# Patient Record
Sex: FEMALE | Race: BLACK OR AFRICAN AMERICAN | NOT HISPANIC OR LATINO | Employment: OTHER | URBAN - METROPOLITAN AREA
[De-identification: names, ages, dates, MRNs, and addresses within clinical notes are randomized per-mention and may not be internally consistent; named-entity substitution may affect disease eponyms.]

---

## 2020-01-01 ENCOUNTER — ANESTHESIA EVENT (OUTPATIENT)
Dept: INTENSIVE CARE | Facility: OTHER | Age: 55
End: 2020-01-01

## 2020-01-01 ENCOUNTER — ANESTHESIA (OUTPATIENT)
Dept: INTENSIVE CARE | Facility: OTHER | Age: 55
End: 2020-01-01

## 2020-01-01 ENCOUNTER — HOSPITAL ENCOUNTER (INPATIENT)
Facility: OTHER | Age: 55
LOS: 1 days | DRG: 871 | End: 2020-03-20
Attending: EMERGENCY MEDICINE | Admitting: EMERGENCY MEDICINE
Payer: MEDICARE

## 2020-01-01 VITALS
SYSTOLIC BLOOD PRESSURE: 188 MMHG | BODY MASS INDEX: 30.42 KG/M2 | TEMPERATURE: 98 F | WEIGHT: 182.56 LBS | DIASTOLIC BLOOD PRESSURE: 116 MMHG | OXYGEN SATURATION: 92 % | RESPIRATION RATE: 25 BRPM | HEIGHT: 65 IN

## 2020-01-01 DIAGNOSIS — R41.82 ALTERED MENTAL STATUS: ICD-10-CM

## 2020-01-01 DIAGNOSIS — J18.9 COMMUNITY ACQUIRED PNEUMONIA, BILATERAL: Primary | ICD-10-CM

## 2020-01-01 DIAGNOSIS — R00.0 TACHYCARDIA: ICD-10-CM

## 2020-01-01 DIAGNOSIS — R05.9 COUGH: ICD-10-CM

## 2020-01-01 DIAGNOSIS — J96.01 ACUTE RESPIRATORY FAILURE WITH HYPOXIA: ICD-10-CM

## 2020-01-01 LAB
ALBUMIN SERPL BCP-MCNC: 2.4 G/DL (ref 3.5–5.2)
ALBUMIN SERPL BCP-MCNC: 2.7 G/DL (ref 3.5–5.2)
ALLENS TEST: ABNORMAL
ALLENS TEST: ABNORMAL
ALP SERPL-CCNC: 173 U/L (ref 55–135)
ALP SERPL-CCNC: 306 U/L (ref 55–135)
ALT SERPL W/O P-5'-P-CCNC: 27 U/L (ref 10–44)
ALT SERPL W/O P-5'-P-CCNC: 46 U/L (ref 10–44)
ANION GAP SERPL CALC-SCNC: 16 MMOL/L (ref 8–16)
ANION GAP SERPL CALC-SCNC: 23 MMOL/L (ref 8–16)
ANION GAP SERPL CALC-SCNC: 27 MMOL/L (ref 8–16)
ANISOCYTOSIS BLD QL SMEAR: SLIGHT
APTT BLDCRRT: 48.5 SEC (ref 21–32)
AST SERPL-CCNC: 173 U/L (ref 10–40)
AST SERPL-CCNC: 63 U/L (ref 10–40)
BACTERIA #/AREA URNS HPF: ABNORMAL /HPF
BASOPHILS # BLD AUTO: 0.14 K/UL (ref 0–0.2)
BASOPHILS # BLD AUTO: ABNORMAL K/UL (ref 0–0.2)
BASOPHILS NFR BLD: 0 % (ref 0–1.9)
BASOPHILS NFR BLD: 0.5 % (ref 0–1.9)
BILIRUB SERPL-MCNC: 0.7 MG/DL (ref 0.1–1)
BILIRUB SERPL-MCNC: 0.8 MG/DL (ref 0.1–1)
BILIRUB UR QL STRIP: NEGATIVE
BNP SERPL-MCNC: 208 PG/ML (ref 0–99)
BUN SERPL-MCNC: 14 MG/DL (ref 6–20)
BUN SERPL-MCNC: 8 MG/DL (ref 6–20)
BUN SERPL-MCNC: 9 MG/DL (ref 6–20)
CALCIUM SERPL-MCNC: 7 MG/DL (ref 8.7–10.5)
CALCIUM SERPL-MCNC: 8.4 MG/DL (ref 8.7–10.5)
CALCIUM SERPL-MCNC: 8.4 MG/DL (ref 8.7–10.5)
CHLORIDE SERPL-SCNC: 93 MMOL/L (ref 95–110)
CHLORIDE SERPL-SCNC: 97 MMOL/L (ref 95–110)
CHLORIDE SERPL-SCNC: 98 MMOL/L (ref 95–110)
CLARITY UR: CLEAR
CO2 SERPL-SCNC: 12 MMOL/L (ref 23–29)
CO2 SERPL-SCNC: 13 MMOL/L (ref 23–29)
CO2 SERPL-SCNC: 16 MMOL/L (ref 23–29)
COLOR UR: YELLOW
CREAT SERPL-MCNC: 0.8 MG/DL (ref 0.5–1.4)
CREAT SERPL-MCNC: 1 MG/DL (ref 0.5–1.4)
CREAT SERPL-MCNC: 2.2 MG/DL (ref 0.5–1.4)
CRP SERPL-MCNC: 301.2 MG/L (ref 0–8.2)
DELSYS: ABNORMAL
DELSYS: ABNORMAL
DIFFERENTIAL METHOD: ABNORMAL
DIFFERENTIAL METHOD: ABNORMAL
EOSINOPHIL # BLD AUTO: 0 K/UL (ref 0–0.5)
EOSINOPHIL # BLD AUTO: ABNORMAL K/UL (ref 0–0.5)
EOSINOPHIL NFR BLD: 0 % (ref 0–8)
EOSINOPHIL NFR BLD: 0 % (ref 0–8)
ERYTHROCYTE [DISTWIDTH] IN BLOOD BY AUTOMATED COUNT: 14.6 % (ref 11.5–14.5)
ERYTHROCYTE [DISTWIDTH] IN BLOOD BY AUTOMATED COUNT: 16.2 % (ref 11.5–14.5)
ERYTHROCYTE [SEDIMENTATION RATE] IN BLOOD BY WESTERGREN METHOD: 22 MM/H
ERYTHROCYTE [SEDIMENTATION RATE] IN BLOOD BY WESTERGREN METHOD: 22 MM/H
EST. GFR  (AFRICAN AMERICAN): 28 ML/MIN/1.73 M^2
EST. GFR  (AFRICAN AMERICAN): >60 ML/MIN/1.73 M^2
EST. GFR  (AFRICAN AMERICAN): >60 ML/MIN/1.73 M^2
EST. GFR  (NON AFRICAN AMERICAN): 24 ML/MIN/1.73 M^2
EST. GFR  (NON AFRICAN AMERICAN): >60 ML/MIN/1.73 M^2
EST. GFR  (NON AFRICAN AMERICAN): >60 ML/MIN/1.73 M^2
FIO2: 100
FIO2: 100
GLUCOSE SERPL-MCNC: 129 MG/DL (ref 70–110)
GLUCOSE SERPL-MCNC: 56 MG/DL (ref 70–110)
GLUCOSE SERPL-MCNC: 71 MG/DL (ref 70–110)
GLUCOSE UR QL STRIP: NEGATIVE
HCO3 UR-SCNC: 18 MMOL/L (ref 24–28)
HCO3 UR-SCNC: 21.9 MMOL/L (ref 24–28)
HCT VFR BLD AUTO: 47.6 % (ref 37–48.5)
HCT VFR BLD AUTO: 57.9 % (ref 37–48.5)
HGB BLD-MCNC: 15.7 G/DL (ref 12–16)
HGB BLD-MCNC: 17.4 G/DL (ref 12–16)
HGB UR QL STRIP: ABNORMAL
HYALINE CASTS #/AREA URNS LPF: 0 /LPF
IMM GRANULOCYTES # BLD AUTO: 1.04 K/UL (ref 0–0.04)
IMM GRANULOCYTES # BLD AUTO: ABNORMAL K/UL (ref 0–0.04)
IMM GRANULOCYTES NFR BLD AUTO: 4 % (ref 0–0.5)
IMM GRANULOCYTES NFR BLD AUTO: ABNORMAL % (ref 0–0.5)
INR PPP: 1.4 (ref 0.8–1.2)
KETONES UR QL STRIP: ABNORMAL
LACTATE SERPL-SCNC: 11.9 MMOL/L (ref 0.5–2.2)
LACTATE SERPL-SCNC: 3.6 MMOL/L (ref 0.5–2.2)
LEUKOCYTE ESTERASE UR QL STRIP: ABNORMAL
LYMPHOCYTES # BLD AUTO: 3.6 K/UL (ref 1–4.8)
LYMPHOCYTES # BLD AUTO: ABNORMAL K/UL (ref 1–4.8)
LYMPHOCYTES NFR BLD: 11 % (ref 18–48)
LYMPHOCYTES NFR BLD: 13.6 % (ref 18–48)
MAGNESIUM SERPL-MCNC: 2 MG/DL (ref 1.6–2.6)
MCH RBC QN AUTO: 27.1 PG (ref 27–31)
MCH RBC QN AUTO: 27.5 PG (ref 27–31)
MCHC RBC AUTO-ENTMCNC: 30.1 G/DL (ref 32–36)
MCHC RBC AUTO-ENTMCNC: 33 G/DL (ref 32–36)
MCV RBC AUTO: 82 FL (ref 82–98)
MCV RBC AUTO: 92 FL (ref 82–98)
MICROSCOPIC COMMENT: ABNORMAL
MODE: ABNORMAL
MODE: ABNORMAL
MONOCYTES # BLD AUTO: 1.6 K/UL (ref 0.3–1)
MONOCYTES # BLD AUTO: ABNORMAL K/UL (ref 0.3–1)
MONOCYTES NFR BLD: 10 % (ref 4–15)
MONOCYTES NFR BLD: 6.1 % (ref 4–15)
NEUTROPHILS # BLD AUTO: 19.7 K/UL (ref 1.8–7.7)
NEUTROPHILS NFR BLD: 75.8 % (ref 38–73)
NEUTROPHILS NFR BLD: 79 % (ref 38–73)
NITRITE UR QL STRIP: POSITIVE
NRBC BLD-RTO: 1 /100 WBC
NRBC BLD-RTO: 3 /100 WBC
PCO2 BLDA: 35.5 MMHG (ref 35–45)
PCO2 BLDA: 46.7 MMHG (ref 35–45)
PEEP: 10
PEEP: 14
PH SMN: 7.19 [PH] (ref 7.35–7.45)
PH SMN: 7.4 [PH] (ref 7.35–7.45)
PH UR STRIP: 6 [PH] (ref 5–8)
PHOSPHATE SERPL-MCNC: 2.5 MG/DL (ref 2.7–4.5)
PLATELET # BLD AUTO: 270 K/UL (ref 150–350)
PLATELET # BLD AUTO: 382 K/UL (ref 150–350)
PLATELET BLD QL SMEAR: ABNORMAL
PLATELET BLD QL SMEAR: ABNORMAL
PMV BLD AUTO: 10.2 FL (ref 9.2–12.9)
PMV BLD AUTO: 10.7 FL (ref 9.2–12.9)
PO2 BLDA: 139 MMHG (ref 80–100)
PO2 BLDA: 30 MMHG (ref 40–60)
POC BE: -10 MMOL/L
POC BE: -3 MMOL/L
POC SATURATED O2: 44 % (ref 95–100)
POC SATURATED O2: 99 % (ref 95–100)
POCT GLUCOSE: 103 MG/DL (ref 70–110)
POCT GLUCOSE: 137 MG/DL (ref 70–110)
POCT GLUCOSE: 183 MG/DL (ref 70–110)
POTASSIUM SERPL-SCNC: 4.4 MMOL/L (ref 3.5–5.1)
POTASSIUM SERPL-SCNC: 4.6 MMOL/L (ref 3.5–5.1)
POTASSIUM SERPL-SCNC: 5.6 MMOL/L (ref 3.5–5.1)
PROCALCITONIN SERPL IA-MCNC: 244.23 NG/ML
PROT SERPL-MCNC: 7.9 G/DL (ref 6–8.4)
PROT SERPL-MCNC: 8.6 G/DL (ref 6–8.4)
PROT UR QL STRIP: ABNORMAL
PROTHROMBIN TIME: 15.8 SEC (ref 9–12.5)
RBC # BLD AUTO: 5.8 M/UL (ref 4–5.4)
RBC # BLD AUTO: 6.33 M/UL (ref 4–5.4)
RBC #/AREA URNS HPF: 50 /HPF (ref 0–4)
SAMPLE: ABNORMAL
SAMPLE: ABNORMAL
SITE: ABNORMAL
SITE: ABNORMAL
SODIUM SERPL-SCNC: 129 MMOL/L (ref 136–145)
SODIUM SERPL-SCNC: 133 MMOL/L (ref 136–145)
SODIUM SERPL-SCNC: 133 MMOL/L (ref 136–145)
SP GR UR STRIP: >=1.03 (ref 1–1.03)
SP02: 100
TROPONIN I SERPL DL<=0.01 NG/ML-MCNC: 2.33 NG/ML (ref 0–0.03)
URN SPEC COLLECT METH UR: ABNORMAL
UROBILINOGEN UR STRIP-ACNC: ABNORMAL EU/DL
VT: 320
VT: 320
WBC # BLD AUTO: 17.43 K/UL (ref 3.9–12.7)
WBC # BLD AUTO: 26.06 K/UL (ref 3.9–12.7)
WBC #/AREA URNS HPF: 50 /HPF (ref 0–5)
WBC CLUMPS URNS QL MICRO: ABNORMAL

## 2020-01-01 PROCEDURE — 85610 PROTHROMBIN TIME: CPT

## 2020-01-01 PROCEDURE — 84100 ASSAY OF PHOSPHORUS: CPT

## 2020-01-01 PROCEDURE — 99291 CRITICAL CARE FIRST HOUR: CPT | Mod: 25

## 2020-01-01 PROCEDURE — 99223 PR INITIAL HOSPITAL CARE,LEVL III: ICD-10-PCS | Mod: AI,,, | Performed by: PHYSICIAN ASSISTANT

## 2020-01-01 PROCEDURE — 94761 N-INVAS EAR/PLS OXIMETRY MLT: CPT

## 2020-01-01 PROCEDURE — 99291 CRITICAL CARE FIRST HOUR: CPT | Mod: ,,, | Performed by: INTERNAL MEDICINE

## 2020-01-01 PROCEDURE — 85025 COMPLETE CBC W/AUTO DIFF WBC: CPT

## 2020-01-01 PROCEDURE — 63600175 PHARM REV CODE 636 W HCPCS

## 2020-01-01 PROCEDURE — 63600175 PHARM REV CODE 636 W HCPCS: Performed by: PHYSICIAN ASSISTANT

## 2020-01-01 PROCEDURE — 25000003 PHARM REV CODE 250

## 2020-01-01 PROCEDURE — 87205 SMEAR GRAM STAIN: CPT

## 2020-01-01 PROCEDURE — 87070 CULTURE OTHR SPECIMN AEROBIC: CPT

## 2020-01-01 PROCEDURE — 83880 ASSAY OF NATRIURETIC PEPTIDE: CPT

## 2020-01-01 PROCEDURE — 94003 VENT MGMT INPAT SUBQ DAY: CPT

## 2020-01-01 PROCEDURE — 27000221 HC OXYGEN, UP TO 24 HOURS

## 2020-01-01 PROCEDURE — 84145 PROCALCITONIN (PCT): CPT

## 2020-01-01 PROCEDURE — 83735 ASSAY OF MAGNESIUM: CPT

## 2020-01-01 PROCEDURE — 87040 BLOOD CULTURE FOR BACTERIA: CPT | Mod: 59

## 2020-01-01 PROCEDURE — 36600 WITHDRAWAL OF ARTERIAL BLOOD: CPT

## 2020-01-01 PROCEDURE — 99223 1ST HOSP IP/OBS HIGH 75: CPT | Mod: AI,,, | Performed by: PHYSICIAN ASSISTANT

## 2020-01-01 PROCEDURE — 25000003 PHARM REV CODE 250: Performed by: EMERGENCY MEDICINE

## 2020-01-01 PROCEDURE — 87086 URINE CULTURE/COLONY COUNT: CPT

## 2020-01-01 PROCEDURE — 83605 ASSAY OF LACTIC ACID: CPT

## 2020-01-01 PROCEDURE — 82803 BLOOD GASES ANY COMBINATION: CPT

## 2020-01-01 PROCEDURE — 93005 ELECTROCARDIOGRAM TRACING: CPT

## 2020-01-01 PROCEDURE — 85007 BL SMEAR W/DIFF WBC COUNT: CPT

## 2020-01-01 PROCEDURE — 31500 INSERT EMERGENCY AIRWAY: CPT

## 2020-01-01 PROCEDURE — 94002 VENT MGMT INPAT INIT DAY: CPT

## 2020-01-01 PROCEDURE — 93010 ELECTROCARDIOGRAM REPORT: CPT | Mod: ,,, | Performed by: INTERNAL MEDICINE

## 2020-01-01 PROCEDURE — 80053 COMPREHEN METABOLIC PANEL: CPT

## 2020-01-01 PROCEDURE — 63600175 PHARM REV CODE 636 W HCPCS: Performed by: EMERGENCY MEDICINE

## 2020-01-01 PROCEDURE — 87186 SC STD MICRODIL/AGAR DIL: CPT | Mod: 59

## 2020-01-01 PROCEDURE — 99900026 HC AIRWAY MAINTENANCE (STAT)

## 2020-01-01 PROCEDURE — 84484 ASSAY OF TROPONIN QUANT: CPT

## 2020-01-01 PROCEDURE — 99900035 HC TECH TIME PER 15 MIN (STAT)

## 2020-01-01 PROCEDURE — 99291 PR CRITICAL CARE, E/M 30-74 MINUTES: ICD-10-PCS | Mod: ,,, | Performed by: INTERNAL MEDICINE

## 2020-01-01 PROCEDURE — 80048 BASIC METABOLIC PNL TOTAL CA: CPT

## 2020-01-01 PROCEDURE — 85730 THROMBOPLASTIN TIME PARTIAL: CPT

## 2020-01-01 PROCEDURE — 82962 GLUCOSE BLOOD TEST: CPT

## 2020-01-01 PROCEDURE — 87088 URINE BACTERIA CULTURE: CPT

## 2020-01-01 PROCEDURE — 12000002 HC ACUTE/MED SURGE SEMI-PRIVATE ROOM

## 2020-01-01 PROCEDURE — 81000 URINALYSIS NONAUTO W/SCOPE: CPT

## 2020-01-01 PROCEDURE — U0002 COVID-19 LAB TEST NON-CDC: HCPCS

## 2020-01-01 PROCEDURE — 86140 C-REACTIVE PROTEIN: CPT

## 2020-01-01 PROCEDURE — 87077 CULTURE AEROBIC IDENTIFY: CPT

## 2020-01-01 PROCEDURE — 85027 COMPLETE CBC AUTOMATED: CPT

## 2020-01-01 PROCEDURE — 93010 EKG 12-LEAD: ICD-10-PCS | Mod: ,,, | Performed by: INTERNAL MEDICINE

## 2020-01-01 PROCEDURE — 25000003 PHARM REV CODE 250: Performed by: PHYSICIAN ASSISTANT

## 2020-01-01 PROCEDURE — 36415 COLL VENOUS BLD VENIPUNCTURE: CPT

## 2020-01-01 RX ORDER — SODIUM CHLORIDE 9 MG/ML
INJECTION, SOLUTION INTRAVENOUS CONTINUOUS
Status: DISCONTINUED | OUTPATIENT
Start: 2020-01-01 | End: 2020-01-01

## 2020-01-01 RX ORDER — SODIUM BICARBONATE 1 MEQ/ML
100 SYRINGE (ML) INTRAVENOUS ONCE
Status: COMPLETED | OUTPATIENT
Start: 2020-01-01 | End: 2020-01-01

## 2020-01-01 RX ORDER — NOREPINEPHRINE BITARTRATE 1 MG/ML
INJECTION, SOLUTION INTRAVENOUS
Status: DISCONTINUED
Start: 2020-01-01 | End: 2020-01-01 | Stop reason: WASHOUT

## 2020-01-01 RX ORDER — ONDANSETRON 2 MG/ML
4 INJECTION INTRAMUSCULAR; INTRAVENOUS EVERY 8 HOURS PRN
Status: DISCONTINUED | OUTPATIENT
Start: 2020-01-01 | End: 2020-01-01 | Stop reason: HOSPADM

## 2020-01-01 RX ORDER — ROCURONIUM BROMIDE 10 MG/ML
INJECTION, SOLUTION INTRAVENOUS CODE/TRAUMA/SEDATION MEDICATION
Status: COMPLETED | OUTPATIENT
Start: 2020-01-01 | End: 2020-01-01

## 2020-01-01 RX ORDER — SODIUM CHLORIDE 0.9 % (FLUSH) 0.9 %
10 SYRINGE (ML) INJECTION
Status: DISCONTINUED | OUTPATIENT
Start: 2020-01-01 | End: 2020-01-01 | Stop reason: HOSPADM

## 2020-01-01 RX ORDER — HEPARIN SODIUM 5000 [USP'U]/ML
5000 INJECTION, SOLUTION INTRAVENOUS; SUBCUTANEOUS EVERY 8 HOURS
Status: DISCONTINUED | OUTPATIENT
Start: 2020-01-01 | End: 2020-01-01

## 2020-01-01 RX ORDER — CEFEPIME HYDROCHLORIDE 1 G/50ML
1 INJECTION, SOLUTION INTRAVENOUS
Status: DISCONTINUED | OUTPATIENT
Start: 2020-01-01 | End: 2020-01-01 | Stop reason: HOSPADM

## 2020-01-01 RX ORDER — FAMOTIDINE 10 MG/ML
20 INJECTION INTRAVENOUS 2 TIMES DAILY
Status: DISCONTINUED | OUTPATIENT
Start: 2020-01-01 | End: 2020-01-01

## 2020-01-01 RX ORDER — VANCOMYCIN HCL IN 5 % DEXTROSE 1.25 G/25
1250 PLASTIC BAG, INJECTION (ML) INTRAVENOUS
Status: DISCONTINUED | OUTPATIENT
Start: 2020-01-01 | End: 2020-01-01 | Stop reason: HOSPADM

## 2020-01-01 RX ORDER — SODIUM CHLORIDE, SODIUM LACTATE, POTASSIUM CHLORIDE, CALCIUM CHLORIDE 600; 310; 30; 20 MG/100ML; MG/100ML; MG/100ML; MG/100ML
INJECTION, SOLUTION INTRAVENOUS CONTINUOUS
Status: DISCONTINUED | OUTPATIENT
Start: 2020-01-01 | End: 2020-01-01

## 2020-01-01 RX ORDER — NAPROXEN SODIUM 220 MG/1
81 TABLET, FILM COATED ORAL DAILY
COMMUNITY

## 2020-01-01 RX ORDER — PANTOPRAZOLE SODIUM 40 MG/10ML
40 INJECTION, POWDER, LYOPHILIZED, FOR SOLUTION INTRAVENOUS 2 TIMES DAILY
Status: DISCONTINUED | OUTPATIENT
Start: 2020-01-01 | End: 2020-01-01 | Stop reason: HOSPADM

## 2020-01-01 RX ORDER — IPRATROPIUM BROMIDE AND ALBUTEROL SULFATE 2.5; .5 MG/3ML; MG/3ML
3 SOLUTION RESPIRATORY (INHALATION) EVERY 6 HOURS
Status: DISCONTINUED | OUTPATIENT
Start: 2020-01-01 | End: 2020-01-01

## 2020-01-01 RX ORDER — PROPRANOLOL HYDROCHLORIDE 60 MG/1
60 TABLET ORAL 3 TIMES DAILY
COMMUNITY

## 2020-01-01 RX ADMIN — ROCURONIUM BROMIDE 30 MG: 10 SOLUTION INTRAVENOUS at 09:03

## 2020-01-01 RX ADMIN — SODIUM CHLORIDE 500 ML: 0.9 INJECTION, SOLUTION INTRAVENOUS at 03:03

## 2020-01-01 RX ADMIN — SODIUM CHLORIDE: 0.9 INJECTION, SOLUTION INTRAVENOUS at 10:03

## 2020-01-01 RX ADMIN — VANCOMYCIN HYDROCHLORIDE 2000 MG: 500 INJECTION, POWDER, LYOPHILIZED, FOR SOLUTION INTRAVENOUS at 02:03

## 2020-01-01 RX ADMIN — SODIUM BICARBONATE 100 MEQ: 84 INJECTION, SOLUTION INTRAVENOUS at 02:03

## 2020-01-01 RX ADMIN — NOREPINEPHRINE BITARTRATE 1 MCG/KG/MIN: 1 INJECTION, SOLUTION, CONCENTRATE INTRAVENOUS at 06:03

## 2020-01-01 RX ADMIN — CEFTRIAXONE 2 G: 2 INJECTION, SOLUTION INTRAVENOUS at 11:03

## 2020-01-01 RX ADMIN — SODIUM CHLORIDE, SODIUM LACTATE, POTASSIUM CHLORIDE, AND CALCIUM CHLORIDE: .6; .31; .03; .02 INJECTION, SOLUTION INTRAVENOUS at 02:03

## 2020-01-01 RX ADMIN — ETOMIDATE 20 MG: 2 INJECTION, SOLUTION INTRAVENOUS at 09:03

## 2020-01-01 RX ADMIN — ROCURONIUM BROMIDE 70 MG: 10 SOLUTION INTRAVENOUS at 09:03

## 2020-03-19 PROBLEM — J18.9 COMMUNITY ACQUIRED PNEUMONIA, BILATERAL: Status: ACTIVE | Noted: 2020-01-01

## 2020-03-20 PROBLEM — E87.1 HYPONATREMIA: Status: ACTIVE | Noted: 2020-01-01

## 2020-03-20 PROBLEM — I46.9 CARDIAC ARREST: Status: ACTIVE | Noted: 2020-01-01

## 2020-03-20 PROBLEM — J96.00 ACUTE RESPIRATORY FAILURE: Status: ACTIVE | Noted: 2020-01-01

## 2020-03-20 NOTE — ASSESSMENT & PLAN NOTE
- arrest occurred approx. 01:23 AM   - just prior to arrest she had poor responsiveness to pain, and respiratory therapist noted no cough/gag reflex with suctioning   - just after patient had significant bradycardia and then what appeared to be asystole on the monitor   - 1 dose of epinephrine was administered with ROSC at first pulse check at 01:26  - currently without need for pressors

## 2020-03-20 NOTE — CONSULTS
"  [facility]  Adult Nutrition  Consult Note    SUMMARY     Recommendations    1. When medically able ADAT to low sodium.     2. If TF recs needed: Impact peptide 1.5 @ 40 ml/hr   to provide 1440 kcals(100% EEN), 90 g AA(80% EPN), and 739 ml water. +150 ml water q6hrs.   Initiate at 10ml/hr and increase by 10ml/hr until goal rate is achieved.     3. If TPN recs needed: Clinimix  4.25/10 @ 85 ml/hr   to provide 1540 kcals(107% EEN), 87 g AA( 77 %EPN), 204 g dextrose.     Goals: 1. Initiate nutrition by RD follow up.   Nutrition Goal Status: new, other (comment)  Communication of RD Recs: (POC)    Reason for Assessment    Reason For Assessment: consult  Diagnosis: infection/sepsis  Relevant Medical History: HTN, GERD  Interdisciplinary Rounds: did not attend  General Information Comments:   3.20.20- Pt is a 55 y.o. female. Pt intubated and NPO at this time. No edema noted. No UBW in chart.   NFPE not performed patient has been screened for SARS-CoV-2 (COVID-19) and has been placed on airborne and contact precautions.  Nutrition Discharge Planning: Unable to determine at this time.     Nutrition Risk Screen    Nutrition Risk Screen: no indicators present    Anthropometrics    Height Method: Measured  Height: 5' 5" (165.1 cm)  Height (inches): 65 in  Weight Method: Bed Scale  Weight: 82.8 kg (182 lb 8.7 oz)  Weight (lb): 182.54 lb  Ideal Body Weight (IBW), Female: 125 lb  % Ideal Body Weight, Female (lb): 146.03 %  BMI (Calculated): 30.4  BMI Grade: 30 - 34.9- obesity - grade I     Lab/Procedures/Meds    Pertinent Labs Reviewed: reviewed  Pertinent Labs Comments: Na 133  Pertinent Medications Reviewed: reviewed  Pertinent Medications Comments: Pantoprazole, Vanc    Estimated/Assessed Needs    Weight Used For Calorie Calculations: 82.8 kg (182 lb 8.7 oz)  Energy Calorie Requirements (kcal): 1439 kcals/day  Energy Need Method: Rai State  Protein Requirements: 113 g(2.0 g/kg IBW)  Weight Used For " Protein Calculations: 56.8 kg (125 lb 3.5 oz)  Fluid Requirements (mL): 1mL/kcal or per MD  Estimated Fluid Requirement Method: RDA Method  RDA Method (mL): 1439     Nutrition Prescription Ordered    Current Diet Order: NPO    Evaluation of Received Nutrient/Fluid Intake    Energy Calories Required: not meeting needs  Protein Required: not meeting needs  Fluid Required: not meeting needs  % Intake of Estimated Energy Needs: 0 %  % Meal Intake: NPO    Nutrition Risk    Level of Risk/Frequency of Follow-up: moderate - high     Assessment and Plan    Nutrition Problem  Inadequate energy intake     Related to (etiology):   Inability to consume sufficient energy     Signs and Symptoms (as evidenced by):   NPO     Interventions (treatment strategy):  Collaboration of nutrition care w/ other providers      Nutrition Diagnosis Status:   New    Monitor and Evaluation    Food and Nutrient Intake: energy intake  Food and Nutrient Adminstration: diet order, enteral and parenteral nutrition administration  Knowledge/Beliefs/Attitudes: food and nutrition knowledge/skill  Physical Activity and Function: nutrition-related ADLs and IADLs  Anthropometric Measurements: weight  Biochemical Data, Medical Tests and Procedures: lipid profile, glucose/endocrine profile, electrolyte and renal panel, gastrointestinal profile, inflammatory profile  Nutrition-Focused Physical Findings: overall appearance     Malnutrition Assessment     Ja Score: 9  NFPE not performed patient has been screened for SARS-CoV-2 (COVID-19) and has been placed on airborne and contact precautions.     Nutrition Follow-Up    RD Follow-up?: Yes

## 2020-03-20 NOTE — NURSING
Spoke with MD Whyte on unit, notified of patient's code earlier this am.  RN asked if wanted to initiate hypothermia treatment on patient.  Per MD Whyte, do not start hypothermic treatment on patient.

## 2020-03-20 NOTE — H&P
[facility] Medicine  History & Physical    Patient Name: Haylee Mistry  MRN: [id number]  Admission Date: 3/19/2020  Attending Physician: Jing Ruiz MD   Primary Care Provider: Samra Carrasquillo MD         Patient information was obtained from patient, past medical records and ER records.     Subjective:     Principal Problem:Sepsis    Chief Complaint:   Chief Complaint   Patient presents with    Altered Mental Status     Hx of UTI, cough X a couple of days and MS        HPI: Ms. Haylee Mistry is a 55 y.o. female, with PMH of Has MS, incontinence (self-caths q4-6 hours), frequent UTI's, she presented due to being disorietned at home today. Her daughter states that usually she feeds herself, and assists with transfers, but she was unable to do so today, and her legs were contracted. Additionally, she wasn't drinking water as usual. Associated symptoms include cough x 1 day, and shortness of breath, with dark colored urine.  She denies any sick contacts or recent travel. She has no sick contacts and has not run a fever at home. In the ED, she was found to have low O2 sats upon arrival at 79%, and she was intubated after a trial on NRBM. She was found to have UTI, and CXR concerning for b/l PNA. She was tested for COVID-19.     Past Medical History:   Diagnosis Date    Depression     GERD (gastroesophageal reflux disease)     High cholesterol     Hypertension     Incontinence of urine     Multiple sclerosis        Past Surgical History:   Procedure Laterality Date    none      URETERAL STENT PLACEMENT         Review of patient's allergies indicates:  No Known Allergies    No current facility-administered medications on file prior to encounter.      Current Outpatient Medications on File Prior to Encounter   Medication Sig    aspirin 81 MG Chew Take 81 mg by mouth once daily.    duloxetine (CYMBALTA) 30 MG capsule Take 30 mg by mouth once daily.    gabapentin  (NEURONTIN) 400 MG capsule     propranoloL (INDERAL) 60 MG tablet Take 60 mg by mouth 3 (three) times daily.    acetaminophen (TYLENOL) 325 MG tablet Take 2 tablets (650 mg total) by mouth every 6 (six) hours as needed for Temperature greater than (or equal to 101 degree F).    AMPHETAMINE SALT COMBO 10 MG tablet     atorvastatin (LIPITOR) 20 MG tablet Take 20 mg by mouth once daily.    baclofen (LIORESAL) 20 MG tablet Take 20 mg by mouth 4 (four) times daily.    bisacodyl (DULCOLAX) 10 mg Supp Place 10 mg rectally daily as needed.    cyanocobalamin (VITAMIN B-12) 1000 MCG tablet Take 1 tablet (1,000 mcg total) by mouth once daily.    docusate sodium (COLACE) 100 MG capsule Take 100 mg by mouth 2 (two) times daily.    ergocalciferol (VITAMIN D2) 50,000 unit Cap Take 50,000 Units by mouth every 7 days.    folic acid (FOLVITE) 1 MG tablet Take 1 mg by mouth once daily.    glatiramer (COPAXONE) 40 mg/mL Syrg Inject 40 mg into the skin every Mon, Wed, Fri.    hydrocodone-acetaminophen 10-325mg (NORCO)  mg Tab     lisinopril (PRINIVIL,ZESTRIL) 20 MG tablet Take 20 mg by mouth once daily.    magnesium hydroxide 400 mg/5 ml 400 mg/5 mL Susp Take 30 mLs by mouth daily as needed.    ondansetron (ZOFRAN-ODT) 8 MG TbDL Take 8 mg by mouth every 8 (eight) hours.    pantoprazole (PROTONIX) 40 MG tablet Take 40 mg by mouth once daily.    polyethylene glycol (GLYCOLAX) 17 gram PwPk Take 17 g by mouth every 12 (twelve) hours as needed.    senna-docusate 8.6-50 mg (PERICOLACE) 8.6-50 mg per tablet Take 1 tablet by mouth 2 (two) times daily.    tramadol (ULTRAM) 50 mg tablet     zolpidem (AMBIEN) 10 mg Tab Take 10 mg by mouth nightly as needed.     Family History     Problem Relation (Age of Onset)    Hypertension Mother, Father, Daughter        Tobacco Use    Smoking status: Former Smoker    Smokeless tobacco: Never Used   Substance and Sexual Activity    Alcohol use: No    Drug use: No    Sexual  activity: Never     Review of Systems   Unable to perform ROS: Intubated     Objective:     Vital Signs (Most Recent):  Temp: 97.5 °F (36.4 °C) (03/19/20 2007)  Pulse: 110 (03/20/20 0228)  Resp: (!) 21 (03/20/20 0228)  BP: 114/63 (03/20/20 0228)  SpO2: (!) 74 % (03/20/20 0228) Vital Signs (24h Range):  Temp:  [97.5 °F (36.4 °C)] 97.5 °F (36.4 °C)  Pulse:  [] 110  Resp:  [18-24] 21  SpO2:  [74 %-100 %] 74 %  BP: (114-179)/() 114/63     Weight: 82.8 kg (182 lb 8.7 oz)  Body mass index is 30.38 kg/m².    Physical Exam   Constitutional: She appears well-developed and well-nourished. No distress.   HENT:   Head: Normocephalic and atraumatic.   Eyes: Pupils are equal, round, and reactive to light. Conjunctivae and EOM are normal. No scleral icterus.   Neck: Normal range of motion. Neck supple. No tracheal deviation present.   Cardiovascular: Normal rate, regular rhythm, normal heart sounds and intact distal pulses. Exam reveals no gallop and no friction rub.   No murmur heard.  Pulmonary/Chest: Effort normal and breath sounds normal. No stridor. No respiratory distress. She has no wheezes.   Examined while intubated. Mechanical breath sounds.   Abdominal: Soft. Bowel sounds are normal. She exhibits no distension. There is no tenderness. There is no guarding.   Neurological:   Bilateral pupils very sluggish, response is minimal. Does not respond to pain.    Skin: Skin is warm and dry. Capillary refill takes more than 3 seconds. She is not diaphoretic.   Cool extremities.    Nursing note and vitals reviewed.        CRANIAL NERVES     CN III, IV, VI   Pupils are equal, round, and reactive to light.  Extraocular motions are normal.        Significant Labs:   BMP:   Recent Labs   Lab 03/19/20 2025   *   *   K 4.4   CL 97   CO2 16*   BUN 8   CREATININE 0.8   CALCIUM 8.4*   MG 2.0     CBC:   Recent Labs   Lab 03/19/20 2025   WBC 17.43*   HGB 15.7   HCT 47.6   *     CMP:   Recent Labs   Lab  03/19/20 2025   *   K 4.4   CL 97   CO2 16*   *   BUN 8   CREATININE 0.8   CALCIUM 8.4*   PROT 7.9   ALBUMIN 2.4*   BILITOT 0.7   ALKPHOS 173*   AST 63*   ALT 27   ANIONGAP 16   EGFRNONAA >60     Urine Culture: No results for input(s): LABURIN in the last 48 hours.  Urine Studies:   Recent Labs   Lab 03/19/20  2213   COLORU Yellow   APPEARANCEUA Clear   PHUR 6.0   SPECGRAV >=1.030*   PROTEINUA 3+*   GLUCUA Negative   KETONESU Trace*   BILIRUBINUA Negative   OCCULTUA 3+*   NITRITE Positive*   UROBILINOGEN 4.0-6.0*   LEUKOCYTESUR Trace*   RBCUA 50*   WBCUA 50*   BACTERIA Moderate*   HYALINECASTS 0     All pertinent labs within the past 24 hours have been reviewed.    Significant Imaging: I have reviewed all pertinent imaging results/findings within the past 24 hours.   Imaging Results          X-Ray Chest AP Portable (Final result)  Result time 03/19/20 21:56:41    Final result by Jo Ann Castaneda MD (03/19/20 21:56:41)                 Impression:      Findings may represent multifocal pneumonia.  Pulmonary edema may be another consideration.      Electronically signed by: Jo Ann Castaneda  Date:    03/19/2020  Time:    21:56             Narrative:    EXAMINATION:  AP PORTABLE CHEST    CLINICAL HISTORY:  Cough    TECHNIQUE:  AP portable chest radiograph was submitted.    COMPARISON:  03/19/2020 at 20:40    FINDINGS:  The tip of the feeding tube is seen approximately 3 cm above the christine.  The tip of the feeding tube is seen projecting over the stomach.  AP portable chest radiograph demonstrates a cardiac silhouette within normal limits.  There are bilateral airspace and interstitial opacities.  There is asymmetric elevation of the right hemidiaphragm.                               X-Ray Chest AP Portable (Final result)  Result time 03/19/20 20:48:25    Final result by Obed Zheng MD (03/19/20 20:48:25)                 Impression:      1. Central hilar interstitial and parenchymal findings may  reflect edema although given clinical history of sepsis, nonspecific infectious or inflammatory pneumonitis are considerations, correlation is advised.      Electronically signed by: Obed Zheng MD  Date:    03/19/2020  Time:    20:48             Narrative:    EXAMINATION:  XR CHEST AP PORTABLE    CLINICAL HISTORY:  Sepsis;    TECHNIQUE:  Single frontal view of the chest was performed.    COMPARISON:  02/23/2016    FINDINGS:  The cardiomediastinal silhouette is not enlarged.  There is elevation of the right hemidiaphragm..  There is no pleural effusion.  The trachea is midline.  The lungs are symmetrically expanded bilaterally with prominent interstitial and parenchymal attenuation primarily in a perihilar distribution..  No large focal consolidation seen.  There is no pneumothorax.  The osseous structures are remarkable for degenerative changes..                                 Assessment/Plan:     * Sepsis  - suspect 2/2 UTI and b/l PNA   - concern for COVID-19, testing ordered, isolation ordered, COVID-19 admission panel ordered   - lactic acid elevated upon admission at 3.6, leukocytosis of 17k, sources: UTI and b/l PNA    - continue fluids   - continuee IV antibiotics:    - monitor       Hyponatremia   - Na of 121 upon ED labs   - start IV fluids   - assess Na z1oboly     Acute respiratory failure  - presented to ED with sats of 79% and was intubated at approx 2100 by ED MD   - satting well on vent upon arrival to ICU   - suspect 2/2 b/l pneumonia   - pulmonary critical care consulted   - after arrest patient had poor sats in 70% range, ABG ordered, pending     Acute cystitis  - s/p rocephin in ED   - continue IV antibiotics w/ change to vancomycin and cefepime   -  pending       Metabolic encephalopathy  - CT Head ordered   - suspect 2/2 underlying infectious process   - monitor lytes       s/p Cardiac arrest  - arrest occurred approx. 01:23 AM   - just prior to arrest she had poor responsiveness to  pain, and respiratory therapist noted no cough/gag reflex with suctioning   - just after patient had significant bradycardia and then what appeared to be asystole on the monitor   - 1 dose of epinephrine was administered with ROSC at first pulse check at 01:26  - currently without need for pressors     Community acquired pneumonia, bilateral      - start IV abx: vancomycin & cefepime   - PORT score: 137      Essential hypertension  - normotensive at present   - monitor blood pressure   - home meds include: lisinopril 20 ng QD, propranolol 60 mg TID      VTE Risk Mitigation (From admission, onward)         Ordered     heparin (porcine) injection 5,000 Units  Every 8 hours      03/20/20 0112     IP VTE HIGH RISK PATIENT  Once      03/20/20 0015     Place sequential compression device  Until discontinued      03/20/20 0015                   Jessenia Lara PA-C  Department of Hospital Medicine   [facility]

## 2020-03-20 NOTE — PLAN OF CARE
Patient received on mechanical vent, settings as documented. Pt coded shortly after arrival to the ICU.  VBG done, reported to MARIAMA Masterson, ABG could not be obtained. Unable to keep pulse ox on pt, will continue to monitor.

## 2020-03-20 NOTE — TREATMENT PLAN
This is a 54 yo woman s/p cardiac arrest who is now actively dying. Pupils fixed and dilated. Reviewed case with Dr. Stevens and I concur that heroic resuscitation is not indicated. CB

## 2020-03-20 NOTE — SUBJECTIVE & OBJECTIVE
Past Medical History:   Diagnosis Date    Depression     GERD (gastroesophageal reflux disease)     High cholesterol     Hypertension     Incontinence of urine     Multiple sclerosis        Past Surgical History:   Procedure Laterality Date    none      URETERAL STENT PLACEMENT         Review of patient's allergies indicates:  No Known Allergies    Family History     Problem Relation (Age of Onset)    Hypertension Mother, Father, Daughter        Tobacco Use    Smoking status: Former Smoker    Smokeless tobacco: Never Used   Substance and Sexual Activity    Alcohol use: No    Drug use: No    Sexual activity: Never         Review of Systems   Unable to perform ROS: Acuity of condition     Objective:     Vital Signs (Most Recent):  Temp: 97.5 °F (36.4 °C) (03/19/20 2007)  Pulse: (!) 116 (03/20/20 0817)  Resp: (!) 27 (03/20/20 0817)  BP: (!) 151/72 (03/20/20 0735)  SpO2: (!) 89 % (03/20/20 0817) Vital Signs (24h Range):  Temp:  [97.5 °F (36.4 °C)] 97.5 °F (36.4 °C)  Pulse:  [] 116  Resp:  [18-40] 27  SpO2:  [74 %-100 %] 89 %  BP: (108-186)/() 151/72     Weight: 82.8 kg (182 lb 8.7 oz)  Body mass index is 30.38 kg/m².      Intake/Output Summary (Last 24 hours) at 3/20/2020 1035  Last data filed at 3/20/2020 0613  Gross per 24 hour   Intake 56.83 ml   Output 200 ml   Net -143.17 ml       Physical Exam   Constitutional: No distress. She is intubated.   HENT:   Head: Normocephalic and atraumatic.   Mouth/Throat: No oropharyngeal exudate.   ETT in place   Eyes: Conjunctivae are normal. No scleral icterus.   Cardiovascular: Regular rhythm.  Occasional extrasystoles are present. Tachycardia present.   Murmur heard.  Pulmonary/Chest: No accessory muscle usage. Tachypnea noted. She is intubated. No respiratory distress. She has no wheezes. She has rhonchi. She has no rales.   Abdominal: Soft. Bowel sounds are normal. She exhibits no distension. There is no tenderness.   Musculoskeletal: She exhibits no  edema.   Neurological: She is unresponsive.   Pupil fixed and dilated  Weak cough  Questionable cornea reflex  Intermittent myoclonic jerks  Increased muscle tone in BLE   Skin: Skin is warm and dry. Rash (intertriginous areas) noted. She is not diaphoretic.   Nursing note and vitals reviewed.      Vents:  Vent Mode: A/C (03/20/20 0817)  Ventilator Initiated: Yes (03/19/20 2111)  Set Rate: 26 BPM (03/20/20 0817)  Vt Set: 320 mL (03/20/20 0817)  Pressure Support: 0 cmH20 (03/20/20 0817)  PEEP/CPAP: 14 cmH20 (03/20/20 0817)  Oxygen Concentration (%): 100 (03/20/20 0817)  Peak Airway Pressure: 34 cmH2O (03/20/20 0817)  Plateau Pressure: 0 cmH20 (03/20/20 0817)  Total Ve: 9.3 mL (03/20/20 0817)  F/VT Ratio<105 (RSBI): (!) 80.6 (03/20/20 0817)    Lines/Drains/Airways     Central Venous Catheter Line                 Hemodialysis Catheter 03/20/20 0630 less than 1 day    Permacath 03/20/20 0630 less than 1 day          Drain                 NG/OG Tube 03/19/20 2229 orogastric 18 Fr. less than 1 day         Urethral Catheter 03/19/20 2228 Non-latex 16 Fr. less than 1 day          Airway                 Airway - Non-Surgical 03/19/20 2111 Endotracheal Tube less than 1 day          Intraosseous Line                 Intraosseous Line 03/20/20 0230 Tibia less than 1 day          Peripheral Intravenous Line                 Peripheral IV - Single Lumen 03/19/20 2140 22 G Left Wrist less than 1 day                Significant Labs:    CBC/Anemia Profile:  Recent Labs   Lab 03/19/20 2025 03/20/20  0536   WBC 17.43* 26.06*   HGB 15.7 17.4*   HCT 47.6 57.9*   * 270   MCV 82 92   RDW 14.6* 16.2*        Chemistries:  Recent Labs   Lab 03/19/20 2025 03/20/20  0536 03/20/20  0839   * 133* 133*   K 4.4 4.6 5.6*   CL 97 98 93*   CO2 16* 12* 13*   BUN 8 9 14   CREATININE 0.8 1.0 2.2*   CALCIUM 8.4* 8.4* 7.0*   ALBUMIN 2.4* 2.7*  --    PROT 7.9 8.6*  --    BILITOT 0.7 0.8  --    ALKPHOS 173* 306*  --    ALT 27 46*  --    AST  63* 173*  --    MG 2.0  --   --    PHOS 2.5*  --   --        All pertinent labs within the past 24 hours have been reviewed.    Significant Imaging:   I have reviewed and interpreted all pertinent imaging results/findings within the past 24 hours.

## 2020-03-20 NOTE — PLAN OF CARE
Patient received in ED with respiratory distress and hypoxemia. Patient intubated and placed on settings as documented. ABG done and reported to Dr. Virk. FIO2 decreased to 80%. Sputum culture done. Patient transported to ICU via Ambu with peep valve. Patient placed back on ventilator with same settings.

## 2020-03-20 NOTE — HPI
Ms. Haylee Mistry is a 55 y.o. female, with PMH of Has MS, incontinence (self-caths q4-6 hours), frequent UTI's, she presented due to being disorietned at home today. Her daughter states that usually she feeds herself, and assists with transfers, but she was unable to do so today, and her legs were contracted. Additionally, she wasn't drinking water as usual. Associated symptoms include cough x 1 day, and shortness of breath, with dark colored urine.  She denies any sick contacts or recent travel. She has no sick contacts and has not run a fever at home. In the ED, she was found to have low O2 sats upon arrival at 79%, and she was intubated after a trial on NRBM. She was found to have UTI, and CXR concerning for b/l PNA. She was tested for COVID-19.

## 2020-03-20 NOTE — ASSESSMENT & PLAN NOTE
- presented to ED with sats of 79% and was intubated at approx 2100 by ED MD   - satting well on vent upon arrival to ICU   - suspect 2/2 b/l pneumonia   - pulmonary critical care consulted   - after arrest patient had poor sats in 70% range, ABG ordered, pending

## 2020-03-20 NOTE — PROGRESS NOTES
Pharmacokinetic Initial Assessment: IV Vancomycin    Assessment/Plan:    Initiate intravenous vancomycin with loading dose of 2000 mg once followed by a maintenance dose of vancomycin 1250mg IV every 12 hours  Desired empiric serum trough concentration is 10 to 20 mcg/mL  Draw vancomycin trough level 30 min prior to fourth dose on 3/21/20 at approximately 1400   Pharmacy will continue to follow and monitor vancomycin.      Please contact pharmacy at extension [phone] with any questions regarding this assessment.     Thank you for the consult,   Je Snyder       Patient brief summary:  Haylee Mistry is a 55 y.o. female initiated on antimicrobial therapy with IV Vancomycin for treatment of suspected urinary tract infection and lower respiratory infection     Drug Allergies:   Review of patient's allergies indicates:  No Known Allergies    Actual Body Weight   82.8 kg    Renal Function:   Estimated Creatinine Clearance: 82.4 mL/min (based on SCr of 0.8 mg/dL).,     Dialysis Method (if applicable):  N/A    CBC (last 72 hours):  Recent Labs   Lab Result Units 03/19/20 2025   WBC K/uL 17.43*   Hemoglobin g/dL 15.7   Hematocrit % 47.6   Platelets K/uL 382*   Gran% % 79.0*   Lymph% % 11.0*   Mono% % 10.0   Eosinophil% % 0.0   Basophil% % 0.0   Differential Method  Manual       Metabolic Panel (last 72 hours):  Recent Labs   Lab Result Units 03/19/20 2025 03/19/20  2213   Sodium mmol/L 129*  --    Potassium mmol/L 4.4  --    Chloride mmol/L 97  --    CO2 mmol/L 16*  --    Glucose mg/dL 129*  --    Glucose, UA   --  Negative   BUN, Bld mg/dL 8  --    Creatinine mg/dL 0.8  --    Albumin g/dL 2.4*  --    Total Bilirubin mg/dL 0.7  --    Alkaline Phosphatase U/L 173*  --    AST U/L 63*  --    ALT U/L 27  --    Magnesium mg/dL 2.0  --    Phosphorus mg/dL 2.5*  --        Drug levels (last 3 results):  No results for input(s): VANCOMYCINRA, VANCOMYCINPE, VANCOMYCINTR in the last 72 hours.    Microbiologic  Results:  Microbiology Results (last 7 days)       Procedure Component Value Units Date/Time    Blood Culture #2 **CANNOT BE ORDERED STAT** [[id number]] Collected:  03/19/20 2358    Order Status:  Sent Specimen:  Blood Updated:  03/19/20 2358    Narrative:       Collection has been rescheduled by AB7 at 03/19/2020 21:07 Reason:   Unable to collect pt being incubated AB7  Collection has been rescheduled by AB7 at 03/19/2020 21:07 Reason:   Unable to collect pt being incubated AB7    Blood culture x two cultures. Draw prior to antibiotics. [[id number]] Collected:  03/19/20 2026    Order Status:  Sent Specimen:  Blood from Peripheral, Wrist, Left Updated:  03/19/20 2345    Urine culture [[id number]] Collected:  03/19/20 2213    Order Status:  No result Specimen:  Urine Updated:  03/19/20 2234    Culture, Respiratory with Gram Stain [[id number]] Collected:  03/19/20 2126    Order Status:  Sent Specimen:  Respiratory from Endotracheal Aspirate Updated:  03/19/20 2146    Blood culture x two cultures. Draw prior to antibiotics. [[id number]]     Order Status:  Canceled Specimen:  Blood

## 2020-03-20 NOTE — ASSESSMENT & PLAN NOTE
- normotensive at present   - monitor blood pressure   - home meds include: lisinopril 20 ng QD, propranolol 60 mg TID

## 2020-03-20 NOTE — PROGRESS NOTES
Pt received from ED. Pt intubated without sedation. Pt nonresponsive to pain. Right pupil nonresponsvie to light, left pupil sluggish with minimal response. Pt diaphoretic and cold to touch. BRANNON Lara PA-C in unit rounding, notified of findings.   0123  RT at bedside. Bradycardia then asystole noted on cardiac monitor. Code blue initiated, eICU called, chest compressions started. CPR board and pads placed.   0126  Epi administered, ROSC achieved.   0135  Multiple attempts to obtain labs and ABG failed. PEEP increased to 14, FiO2 increased to 100  0230  IO placed to right tibial plateau.  0233  Pulses weak, unable to obtain BP. Levo started  0252  100 mEq of biacarb IVP administered. Levo turned down to 1.5  0303  Levo turned down to 1. Xray at bedside.  0318  Dr. Smart cameraed into room. Requests to wean PEEP to be able to transport to  CT. Pt 97% SpO2 observed on cardiac monitor. RR increased to 26 per verbal order and PEEP weaned down to 10.  0324  Pt sustaining SpO2 97%. PEEP weaned down to 7. Involuntary movement observed at this time. BRANNON Lara notified of findings. Levo turned down to 0.8.  0342  Pt sustaining SpO2 of 97%, PEEP titrated down to 5. Levo turned down to 0.6.  0418  Pt observed to desat to 77% while still on 100% FiO2. PEEP turned back up to 10. Unable to transport to CT at this time. Will continue to monitor.

## 2020-03-20 NOTE — CONSULTS
[facility]  Pulmonology  Consult Note    Patient Name: Haylee Mistry  MRN: [id number]  Admission Date: 3/19/2020  Hospital Length of Stay: 1 days  Code Status: DNR  Attending Physician: Jing Ruiz MD  Primary Care Provider: Samra Carrasquillo MD   Principal Problem: Sepsis    Inpatient consult to Pulmonary Critical Care  Consult performed by: Paul Echevarria MD  Consult ordered by: Jing Ruiz MD        Subjective:     HPI:  55 year old female with hx MS (not completely independent in ADLs, and recurrent UTIs presenting afte being found confused and disoriented by her daughter at home today.  At baseline, pt needs assistance with transfers, but is able to dress herself, but today was unable to do so and appeared as though her legs were contracted. Additionally, she had not been eating and drinking as much, and was reporting cough and shortness of breath. On arrival, pt O2 70's but initially hemodynamically stable. She was subsequently intubated, requiring max vent support. She was started on vasopressors, but continued to have increased requirements. Shortly after, midnight, pt went asystole and received one round of chest compressions and Epi x1 with ROSC.  She continued to decline and eventually neither blood pressure via cuff or O2 sat were able to be obtained. Multiple attempts were made to place arterial lines were unsuccessful. Family was notified of grave situation.    Past Medical History:   Diagnosis Date    Depression     GERD (gastroesophageal reflux disease)     High cholesterol     Hypertension     Incontinence of urine     Multiple sclerosis        Past Surgical History:   Procedure Laterality Date    none      URETERAL STENT PLACEMENT         Review of patient's allergies indicates:  No Known Allergies    Family History     Problem Relation (Age of Onset)    Hypertension Mother, Father, Daughter        Tobacco Use    Smoking status: Former Smoker    Smokeless  tobacco: Never Used   Substance and Sexual Activity    Alcohol use: No    Drug use: No    Sexual activity: Never         Review of Systems   Unable to perform ROS: Acuity of condition     Objective:     Vital Signs (Most Recent):  Temp: 97.5 °F (36.4 °C) (03/19/20 2007)  Pulse: (!) 116 (03/20/20 0817)  Resp: (!) 27 (03/20/20 0817)  BP: (!) 151/72 (03/20/20 0735)  SpO2: (!) 89 % (03/20/20 0817) Vital Signs (24h Range):  Temp:  [97.5 °F (36.4 °C)] 97.5 °F (36.4 °C)  Pulse:  [] 116  Resp:  [18-40] 27  SpO2:  [74 %-100 %] 89 %  BP: (108-186)/() 151/72     Weight: 82.8 kg (182 lb 8.7 oz)  Body mass index is 30.38 kg/m².      Intake/Output Summary (Last 24 hours) at 3/20/2020 1035  Last data filed at 3/20/2020 0613  Gross per 24 hour   Intake 56.83 ml   Output 200 ml   Net -143.17 ml       Physical Exam   Constitutional: No distress. She is intubated.   HENT:   Head: Normocephalic and atraumatic.   Mouth/Throat: No oropharyngeal exudate.   ETT in place   Eyes: Conjunctivae are normal. No scleral icterus.   Cardiovascular: Regular rhythm.  Occasional extrasystoles are present. Tachycardia present.   Murmur heard.  Pulmonary/Chest: No accessory muscle usage. Tachypnea noted. She is intubated. No respiratory distress. She has no wheezes. She has rhonchi. She has no rales.   Abdominal: Soft. Bowel sounds are normal. She exhibits no distension. There is no tenderness.   Musculoskeletal: She exhibits no edema.   Neurological: She is unresponsive.   Pupil fixed and dilated  Weak cough  Questionable cornea reflex  Intermittent myoclonic jerks  Increased muscle tone in BLE   Skin: Skin is warm and dry. Rash (intertriginous areas) noted. She is not diaphoretic.   Nursing note and vitals reviewed.      Vents:  Vent Mode: A/C (03/20/20 0817)  Ventilator Initiated: Yes (03/19/20 2111)  Set Rate: 26 BPM (03/20/20 0817)  Vt Set: 320 mL (03/20/20 0817)  Pressure Support: 0 cmH20 (03/20/20 0817)  PEEP/CPAP: 14 cmH20  (03/20/20 0817)  Oxygen Concentration (%): 100 (03/20/20 0817)  Peak Airway Pressure: 34 cmH2O (03/20/20 0817)  Plateau Pressure: 0 cmH20 (03/20/20 0817)  Total Ve: 9.3 mL (03/20/20 0817)  F/VT Ratio<105 (RSBI): (!) 80.6 (03/20/20 0817)    Lines/Drains/Airways     Central Venous Catheter Line                 Hemodialysis Catheter 03/20/20 0630 less than 1 day    Permacath 03/20/20 0630 less than 1 day          Drain                 NG/OG Tube 03/19/20 2229 orogastric 18 Fr. less than 1 day         Urethral Catheter 03/19/20 2228 Non-latex 16 Fr. less than 1 day          Airway                 Airway - Non-Surgical 03/19/20 2111 Endotracheal Tube less than 1 day          Intraosseous Line                 Intraosseous Line 03/20/20 0230 Tibia less than 1 day          Peripheral Intravenous Line                 Peripheral IV - Single Lumen 03/19/20 2140 22 G Left Wrist less than 1 day                Significant Labs:    CBC/Anemia Profile:  Recent Labs   Lab 03/19/20 2025 03/20/20  0536   WBC 17.43* 26.06*   HGB 15.7 17.4*   HCT 47.6 57.9*   * 270   MCV 82 92   RDW 14.6* 16.2*        Chemistries:  Recent Labs   Lab 03/19/20 2025 03/20/20  0536 03/20/20  0839   * 133* 133*   K 4.4 4.6 5.6*   CL 97 98 93*   CO2 16* 12* 13*   BUN 8 9 14   CREATININE 0.8 1.0 2.2*   CALCIUM 8.4* 8.4* 7.0*   ALBUMIN 2.4* 2.7*  --    PROT 7.9 8.6*  --    BILITOT 0.7 0.8  --    ALKPHOS 173* 306*  --    ALT 27 46*  --    AST 63* 173*  --    MG 2.0  --   --    PHOS 2.5*  --   --        All pertinent labs within the past 24 hours have been reviewed.    Significant Imaging:   I have reviewed and interpreted all pertinent imaging results/findings within the past 24 hours.    Assessment/Plan:     CNS:  # Hx Multiple Sclerosis  · No purposeful movements. Not on sedation. Pupils non-reactive. Weak cough. Questionable corneal     CARD:  # Multifactorial Shock  · BS US with dilated LV and decreased LVEF. Trop about 2. No ANGEL on ECG. LA 12  and trending  · On high dose Levo. Unable to obtain cuff blood pressure. Multiple attempts to obtain arterial line were unsuccessful    PULM:  # Acute Hypoxemic Respiratory Failure  · Max vent support , FiO2 100%, PEEP 18 but unable to obtain sat  · Continue LTVV (6-8cc/kg). spO2 goal 97%      FEN/GI:  · Gross electrolyte abnorms. LFTs elevated 2/2 shock vs COVID    RENAL:  # Acute Renal Failure  · Trialysis in place. Avoid nephrotoxins and renally dose all medications    HEME:  · Polycythemia present, likely 2/2 decreased PO intake    ID:  · WBC 27. UA infected. BCx pending. On Vanc and Cefepime. COVID-19 pending    ENDO:  · Goal -180    VTE PPx:  NONE  GI PPx:  Protonix 40mg IV BID    CODE: FULL    Disposition: critically ill with extremely poor prognosis    Thank you for your consult. I will follow-up with patient. Please contact us if you have any additional questions.     Paul Echevarria MD  Pulmonology  [facility]

## 2020-03-20 NOTE — ASSESSMENT & PLAN NOTE
- suspect 2/2 UTI and b/l PNA   - concern for COVID-19, testing ordered, isolation ordered, COVID-19 admission panel ordered   - lactic acid elevated upon admission at 3.6, leukocytosis of 17k, sources: UTI and b/l PNA    - continue fluids   - continuee IV antibiotics:    - monitor

## 2020-03-20 NOTE — PLAN OF CARE
55-year-old woman admitted status post cardiac arrest with hypoxemic respiratory failure with concern for COVID-19.  ROSC was achieved after 1 round of compressions, however patient was severely hypotensive and requiring maximal ventilatory support.  Despite aggressive medical therapies, the patient has continued to deteriorate.  She is on norepinephrine at 1 mcg per kg per minute and we are unable to obtain and blood pressure.  Multiple efforts to place an arterial line by both critical care and anesthesiology were unsuccessful.  There is no palpable pulse.  Pulse oximetry is unable to detect on oxygen saturation despite supplemental FiO2 100%.  Pupils are fixed and dilated.  Some myoclonic movements observed.  Family has been notified that the patient is actively dying, buying but the patient's daughter is struggling comprehend the severity of the situation.  Patient will be made DNR (2 physician DNR with Dr. Santiago) Will request palliative care reach out for emotional support.

## 2020-03-20 NOTE — PLAN OF CARE
Pt remains on mech vent with documented settings. ETT 7.50 25 @lips. Ambu bag and mask at bedside. Oral care done. Alarms set and functioning. Doc currently attempting a-line. Will continue to monitor.

## 2020-03-20 NOTE — SIGNIFICANT EVENT
U Pulmonology & Critical Care Note:  - notified by nurse of asystole on the monitor. TOD 1105. Critical care staff present. Primary team and family notified    Paul Echevarria MD  [facility] Pulmonology/Critical Care, [facility]  11:16 AM 03/20/2020

## 2020-03-20 NOTE — EICU
Rounding (Video Assessment):  Video rounding completed.  Pt resting quiet on ventilator support.

## 2020-03-20 NOTE — PLAN OF CARE
Recommendations    1. When medically able ADAT to low sodium.     2. If TF recs needed: Impact peptide 1.5 @ 40 ml/hr   to provide 1440 kcals(100% EEN), 90 g AA(80% EPN), and 739 ml water. +150 ml water q6hrs.   Initiate at 10ml/hr and increase by 10ml/hr until goal rate is achieved.     3. If TPN recs needed: Clinimix  4.25/10 @ 85 ml/hr   to provide 1540 kcals(107% EEN), 87 g AA( 77 %EPN), 204 g dextrose.     Goals: 1. Initiate nutrition by RD follow up.   Nutrition Goal Status: new, other (comment)  Communication of RD Recs: (POC)

## 2020-03-20 NOTE — ED TRIAGE NOTES
Pt reports to ED with c/o altered mental status since this AM. Pt daughter reports hx of altered mental status due to UTI. Pt daughter states she did see some blood in her urine. Pt daughter reports wet cough and SOB x a couple of days. Pt daughter denies fever at home. Pt has hx of MS and is bowel and bladder incontinent.   Pt daughter reports that pt uses in/out cath at home q4/6.

## 2020-03-20 NOTE — NURSING
Pt went into asystole, lost HR at 1105.  MD Whyte & Rodney on unit and made aware.  Pt remains a DNR.

## 2020-03-20 NOTE — ED NOTES
RN attempted to undress pt and insert catheter and pt sat 68%. MD aware and at bedside to assess need for intubation.

## 2020-03-20 NOTE — PLAN OF CARE
20 1123   Final Note   Assessment Type Final Discharge Note   Anticipated Discharge Disposition

## 2020-03-20 NOTE — NURSING
MD Stevens & MD Whyte at patient's bedside and on unit since start of shift at 0700 today. MD Pato Stevens made aware by RN that unable to obtain a blood pressure since 07:40 am today.  MD Whyte attempted to place art line multiple times at bedside after anesthesiology attempted to place art line multiple times and both were unsuccessful in art line placement.  Unable to obtain an O2 saturation on patient since 0900 this morning. MD Rodney Whyte made aware of inability to obtain an oxygen saturation since 0900.  MD Whyte to call family (daughter) and discuss DNR status for patient.  Per MD Pato Stevens, patient is now DNR status & do not titrate vasopressors anymore at this point.  Per MD Pato Stevens, do not obtain a chest xray for central line placement or give any further scheduled antibiotics/meds.  Family to come to hospital to see patient.

## 2020-03-20 NOTE — SIGNIFICANT EVENT
U Pulmonology and Critical Care Note:  - patient has continued to decline despite aggressive measures, including maximum ventilatory support and high dose vasopressors in the setting of asystolic cardiac arrest. Bedside ECHO revealed significantly decreased LVEF with dilated left ventricle and elevated Troponin. Additionally, pt is exhibiting intermittent myoclonic jerks with fixed and dilated pupils. We have been unable to obtain oxygen saturation or cuff blood pressure for several hours. Multiple attempts to obtain arterial lines, both by myself and staff anesthesiologist were unsuccessful. Poor prognosis and clinical trajectory were relayed to her daughter who had a difficult time accepting rapid clinical decline. Decision was made by 2 staff physicians, including critical care staff, to make patient DNR.    Paul Echevarria MD  [facility] Pulmonology/Critical Care, [facility]  10:15 AM 03/20/2020

## 2020-03-20 NOTE — EICU
Rounding (Video Assessment):  Yes    Intervention Initiated From:  Bedside    Ngozi Communicated with Bedside Nurse regarding:  ECG Change    Nurse Notified:  Yes    Doctor Notified:  Yes    Comments: 01:23 eLert noted CPR in progress nurse reported an patient was bradycardic an went asystolic; patient intubated being bagged 01:25 pulse checked an noted;( was in room via camera) 01:26 ROSC achieved

## 2020-03-20 NOTE — PLAN OF CARE
Provided grief care with family after pt's death. Pt had two family members present. Offered pastoral support with compassionate presence and prayer upon request. Prayed with family outside of pt's room. Did not enter room. Also reviewed grief resources with family. No further spiritual needs expressed.

## 2020-03-20 NOTE — PROGRESS NOTES
e-ICU admit note      Reason for ICU admission:     Acute hypoxic respiratory failure  Intubated in ED  Arrest in ICU      HPI  54 yo non ambulatory female with a history of MS presents with daughter with confusion. Pt has had recent cough and SOB.     Daughter denies any sick contacts or recent travel.     Pt intubated in ED after failed trial on NRM    PHx:    GERD  Depression  HLD  HTN  Multiple sclerosis      Home Meds:    ASA  Propanolol  Gabapentin  Atorvastatin  Folic acid  Glatiramer 40 mg sc 3x/week  Norco  Zofran  Pantoprazole  tranmadol  Ambiem      Significant labs:    WBC 20532  Phos 2.5    Lactate 3.6    UA + nitrite, + 3 occult blood, moderate bacteria      Significant images:    CXR:    There are bilateral airspace and interstitial opacities.  There is asymmetric elevation of the right hemidiaphragm.      ECG:    NSR, no acute changes      I viewed the pt via camera at 1:04 am:    139/97, 109 sinus, 100%, RR 22    Vent  AC , RR 22, FiO2 80%, peep 10         Assessment/Plan      Acute respiratory failure  Vent management  Duonebs    PNA bilat, CAP  Empiric Ceftriaxone, Azithro  Cultures  R/o COVID    UTI      DVT ppx heparin      SUP famotidine        Addendum:    Pt arrested at 1:30 am    PEA, responded to CPR    Pupils dilated bilaterally    CT head ordered

## 2020-03-20 NOTE — SIGNIFICANT EVENT
After Code Blue Ng tube w/ dark brown output which turned to bright red. GI bleed pathways orders started.

## 2020-03-20 NOTE — ED PROVIDER NOTES
Encounter Date: 3/19/2020    SCRIBE #1 NOTE: I, Rossy Marion, am scribing for, and in the presence of, Dr. Virk.       History     Chief Complaint   Patient presents with    Altered Mental Status     Hx of UTI, cough X a couple of days and MS     Time seen by provider: 8:06 PM    This is a 55 y.o. female with a history of MS who presents with daughter due to increasingly altered mental status today. She is not ambulatory at baseline. Daughter reports that patient is normally able to assist with body transfers but seemed weak today and increasingly confused. Daughter confirms recent cough and shortness of breath. She has a history of urinary incontinence and daughter states urine has been dark. She denies any sick contacts or recent travel. Daughter has no other complaints at the time.    The history is provided by the patient and a relative.     Review of patient's allergies indicates:  No Known Allergies  Past Medical History:   Diagnosis Date    Depression     GERD (gastroesophageal reflux disease)     High cholesterol     Hypertension     Incontinence of urine     Multiple sclerosis      Past Surgical History:   Procedure Laterality Date    none      URETERAL STENT PLACEMENT       Family History   Problem Relation Age of Onset    Hypertension Mother     Hypertension Father     Hypertension Daughter      Social History     Tobacco Use    Smoking status: Former Smoker    Smokeless tobacco: Never Used   Substance Use Topics    Alcohol use: No    Drug use: No     Review of Systems   Constitutional: Negative for chills and fever.   HENT: Negative for sore throat.    Eyes: Negative for visual disturbance.   Respiratory: Positive for cough and shortness of breath.    Cardiovascular: Negative for chest pain and palpitations.   Gastrointestinal: Negative for abdominal pain, diarrhea and vomiting.   Musculoskeletal: Negative for joint swelling.   Skin: Negative for rash and wound.   Neurological:  Positive for weakness (generalized). Negative for numbness and headaches.   Psychiatric/Behavioral: Positive for confusion.       Physical Exam     Initial Vitals   BP Pulse Resp Temp SpO2   03/19/20 2001 03/19/20 2001 03/19/20 2001 03/19/20 2007 03/19/20 2007   (!) 144/96 76 (!) 24 97.5 °F (36.4 °C) (!) 79 %      MAP       --                Physical Exam    Nursing note and vitals reviewed.  Constitutional: She appears well-developed and well-nourished. No distress.   HENT:   Head: Normocephalic and atraumatic.   Mouth/Throat: Oropharynx is clear and moist. No oropharyngeal exudate.   Eyes: Conjunctivae and EOM are normal. Pupils are equal, round, and reactive to light.   Neck: Neck supple.   Cardiovascular: Normal rate and normal heart sounds.   No murmur heard.  Pulmonary/Chest: No respiratory distress. She has no wheezes. She has no rhonchi. She has rales (Scattered rales in all lung fields).   Abdominal: Soft. There is no tenderness. There is no rebound and no guarding.   Musculoskeletal: She exhibits no edema or tenderness.   Neurological: She is unresponsive. GCS eye subscore is 1. GCS verbal subscore is 1. GCS motor subscore is 4.   Skin: Skin is warm and dry. No rash noted.   Psychiatric: She has a normal mood and affect. Thought content normal.         ED Course   Intubation  Date/Time: 3/19/2020 10:34 PM  Performed by: Nora Virk MD  Authorized by: Nora Virk MD   Indications: respiratory failure and  airway protection  Intubation method: video-assisted  Patient status: paralyzed (RSI)  Preoxygenation: BVM  Sedatives: etomidate  Paralytic: rocuronium  Tube size: 7.5 mm  Tube type: cuffed  Number of attempts: 1  Cords visualized: yes  Post-procedure assessment: CO2 detector  Breath sounds: equal and absent over the epigastrium  ETT to lip: 23 cm  Tube secured with: ETT madera  Chest x-ray findings: endotracheal tube in appropriate position  Patient tolerance: Patient tolerated the procedure  "well with no immediate complications    Critical Care  Date/Time: 3/19/2020 10:36 PM  Performed by: Nora Virk MD  Authorized by: Nora Virk MD   Direct patient critical care time: 16 minutes  Additional history critical care time: 6 minutes  Ordering / reviewing critical care time: 8 minutes  Documentation critical care time: 6 minutes  Consulting other physicians critical care time: 6 minutes  Consult with family critical care time: 8 (Daughter stated she is POA and mother desires mechanical ventilation, "do everything") minutes  Total critical care time (exclusive of procedural time) : 50 minutes  Critical care was necessary to treat or prevent imminent or life-threatening deterioration of the following conditions: respiratory failure.  Critical care was time spent personally by me on the following activities: blood draw for specimens, development of treatment plan with patient or surrogate, discussions with consultants, interpretation of cardiac output measurements, evaluation of patient's response to treatment, examination of patient, obtaining history from patient or surrogate, ordering and performing treatments and interventions, ordering and review of laboratory studies, ordering and review of radiographic studies, re-evaluation of patient's condition, pulse oximetry, ventilator management and review of old charts.        Labs Reviewed   CBC W/ AUTO DIFFERENTIAL - Abnormal; Notable for the following components:       Result Value    WBC 17.43 (*)     RBC 5.80 (*)     RDW 14.6 (*)     Platelets 382 (*)     nRBC 1 (*)     Gran% 79.0 (*)     Lymph% 11.0 (*)     All other components within normal limits   COMPREHENSIVE METABOLIC PANEL - Abnormal; Notable for the following components:    Sodium 129 (*)     CO2 16 (*)     Glucose 129 (*)     Calcium 8.4 (*)     Albumin 2.4 (*)     Alkaline Phosphatase 173 (*)     AST 63 (*)     All other components within normal limits   PHOSPHORUS - Abnormal; Notable " for the following components:    Phosphorus 2.5 (*)     All other components within normal limits   B-TYPE NATRIURETIC PEPTIDE - Abnormal; Notable for the following components:     (*)     All other components within normal limits   PROCALCITONIN - Abnormal; Notable for the following components:    Procalcitonin 244.23 (*)     All other components within normal limits   C-REACTIVE PROTEIN - Abnormal; Notable for the following components:    .2 (*)     All other components within normal limits   POCT GLUCOSE - Abnormal; Notable for the following components:    POCT Glucose 137 (*)     All other components within normal limits   ISTAT PROCEDURE - Abnormal; Notable for the following components:    POC PO2 139 (*)     POC HCO3 21.9 (*)     All other components within normal limits   CULTURE, BLOOD   CULTURE, BLOOD   CULTURE, RESPIRATORY   MAGNESIUM   C-REACTIVE PROTEIN   PROCALCITONIN   SARS-COV-2 (COVID-19) QUALITATIVE PCR   URINALYSIS, REFLEX TO URINE CULTURE   LACTIC ACID, PLASMA   BASIC METABOLIC PANEL   URINALYSIS MICROSCOPIC     EKG Readings: (Independently Interpreted)   Normal sinus rhythm at rate of 82 with no STEMI and no ectopy.       Imaging Results          X-Ray Chest AP Portable (Final result)  Result time 03/19/20 21:56:41    Final result by Jo Ann Castaneda MD (03/19/20 21:56:41)                 Impression:      Findings may represent multifocal pneumonia.  Pulmonary edema may be another consideration.      Electronically signed by: Jo Ann Castaneda  Date:    03/19/2020  Time:    21:56             Narrative:    EXAMINATION:  AP PORTABLE CHEST    CLINICAL HISTORY:  Cough    TECHNIQUE:  AP portable chest radiograph was submitted.    COMPARISON:  03/19/2020 at 20:40    FINDINGS:  The tip of the feeding tube is seen approximately 3 cm above the christine.  The tip of the feeding tube is seen projecting over the stomach.  AP portable chest radiograph demonstrates a cardiac silhouette within  normal limits.  There are bilateral airspace and interstitial opacities.  There is asymmetric elevation of the right hemidiaphragm.                               X-Ray Chest AP Portable (Final result)  Result time 03/19/20 20:48:25    Final result by Obed Zheng MD (03/19/20 20:48:25)                 Impression:      1. Central hilar interstitial and parenchymal findings may reflect edema although given clinical history of sepsis, nonspecific infectious or inflammatory pneumonitis are considerations, correlation is advised.      Electronically signed by: Obed Zheng MD  Date:    03/19/2020  Time:    20:48             Narrative:    EXAMINATION:  XR CHEST AP PORTABLE    CLINICAL HISTORY:  Sepsis;    TECHNIQUE:  Single frontal view of the chest was performed.    COMPARISON:  02/23/2016    FINDINGS:  The cardiomediastinal silhouette is not enlarged.  There is elevation of the right hemidiaphragm..  There is no pleural effusion.  The trachea is midline.  The lungs are symmetrically expanded bilaterally with prominent interstitial and parenchymal attenuation primarily in a perihilar distribution..  No large focal consolidation seen.  There is no pneumothorax.  The osseous structures are remarkable for degenerative changes..                              X-Rays:   Independently Interpreted Readings:   Chest X-Ray: Prominent bilateral interstitial infiltrates. No pneumothorax. No pleural effusion.     Medical Decision Making:   History:   Old Medical Records: I decided to obtain old medical records.  Independently Interpreted Test(s):   I have ordered and independently interpreted X-rays - see prior notes.  I have ordered and independently interpreted EKG Reading(s) - see prior notes  Clinical Tests:   Lab Tests: Ordered and Reviewed  Radiological Study: Ordered and Reviewed  Medical Tests: Ordered and Reviewed  ED Management:  Emergent evaluation a 55-year-old female with MS, bed bound, presents with complaint of  worsening mental status x1 day.  Daughter admits to noting recent cough and shortness of breath, weak and unable to assist for transfers.  Vital signs with room air hypoxia, afebrile.  Patient's room air oxygen saturation was 79% at presentation.  She did have marked improvement with application of non-rebreather oxygen mask - but this was transient.  GCS is less than 8, and she was intubated to protect her airway.  Chest x-ray and clinical picture is concerning for COVID-19, however procalcitonin and WBC is elevated.  She is covered with Rocephin and azithromycin for community-acquired pneumonia.  Urinalysis is still pending at time of admission - Rocephin given would cover for UTI. A Mendes was placed.  She is admitted in serious condition for further care and monitoring.            Scribe Attestation:   Scribe #1: I performed the above scribed service and the documentation accurately describes the services I performed. I attest to the accuracy of the note.    Attending Attestation:           Physician Attestation for Scribe:  Physician Attestation Statement for Scribe #1: I, Dr. Virk, reviewed documentation, as scribed by Rossy Marion in my presence, and it is both accurate and complete.                 ED Course as of Mar 19 2231   Thu Mar 19, 2020   2051 Called to bedside due to hypoxia. Pt had transient improvement with NRB, now hypoxic. CXR with bilateral infiltrates concerning for COVID-19, will intubate.    [SF]   2122 Discussed case with little.    [SF]   2218 Talked to patient's daughter on the phone.    [SF]      ED Course User Index  [SF] Rossy Marion                Clinical Impression:     1. Community acquired pneumonia, bilateral    2. Altered mental status    3. Tachycardia    4. Cough    5. Acute respiratory failure with hypoxia                ED Disposition Condition    Admit                           Nora Virk MD  03/19/20 2233

## 2020-03-20 NOTE — SUBJECTIVE & OBJECTIVE
Past Medical History:   Diagnosis Date    Depression     GERD (gastroesophageal reflux disease)     High cholesterol     Hypertension     Incontinence of urine     Multiple sclerosis        Past Surgical History:   Procedure Laterality Date    none      URETERAL STENT PLACEMENT         Review of patient's allergies indicates:  No Known Allergies    No current facility-administered medications on file prior to encounter.      Current Outpatient Medications on File Prior to Encounter   Medication Sig    aspirin 81 MG Chew Take 81 mg by mouth once daily.    duloxetine (CYMBALTA) 30 MG capsule Take 30 mg by mouth once daily.    gabapentin (NEURONTIN) 400 MG capsule     propranoloL (INDERAL) 60 MG tablet Take 60 mg by mouth 3 (three) times daily.    acetaminophen (TYLENOL) 325 MG tablet Take 2 tablets (650 mg total) by mouth every 6 (six) hours as needed for Temperature greater than (or equal to 101 degree F).    AMPHETAMINE SALT COMBO 10 MG tablet     atorvastatin (LIPITOR) 20 MG tablet Take 20 mg by mouth once daily.    baclofen (LIORESAL) 20 MG tablet Take 20 mg by mouth 4 (four) times daily.    bisacodyl (DULCOLAX) 10 mg Supp Place 10 mg rectally daily as needed.    cyanocobalamin (VITAMIN B-12) 1000 MCG tablet Take 1 tablet (1,000 mcg total) by mouth once daily.    docusate sodium (COLACE) 100 MG capsule Take 100 mg by mouth 2 (two) times daily.    ergocalciferol (VITAMIN D2) 50,000 unit Cap Take 50,000 Units by mouth every 7 days.    folic acid (FOLVITE) 1 MG tablet Take 1 mg by mouth once daily.    glatiramer (COPAXONE) 40 mg/mL Syrg Inject 40 mg into the skin every Mon, Wed, Fri.    hydrocodone-acetaminophen 10-325mg (NORCO)  mg Tab     lisinopril (PRINIVIL,ZESTRIL) 20 MG tablet Take 20 mg by mouth once daily.    magnesium hydroxide 400 mg/5 ml 400 mg/5 mL Susp Take 30 mLs by mouth daily as needed.    ondansetron (ZOFRAN-ODT) 8 MG TbDL Take 8 mg by mouth every 8 (eight) hours.     pantoprazole (PROTONIX) 40 MG tablet Take 40 mg by mouth once daily.    polyethylene glycol (GLYCOLAX) 17 gram PwPk Take 17 g by mouth every 12 (twelve) hours as needed.    senna-docusate 8.6-50 mg (PERICOLACE) 8.6-50 mg per tablet Take 1 tablet by mouth 2 (two) times daily.    tramadol (ULTRAM) 50 mg tablet     zolpidem (AMBIEN) 10 mg Tab Take 10 mg by mouth nightly as needed.     Family History     Problem Relation (Age of Onset)    Hypertension Mother, Father, Daughter        Tobacco Use    Smoking status: Former Smoker    Smokeless tobacco: Never Used   Substance and Sexual Activity    Alcohol use: No    Drug use: No    Sexual activity: Never     Review of Systems   Unable to perform ROS: Intubated     Objective:     Vital Signs (Most Recent):  Temp: 97.5 °F (36.4 °C) (03/19/20 2007)  Pulse: 110 (03/20/20 0228)  Resp: (!) 21 (03/20/20 0228)  BP: 114/63 (03/20/20 0228)  SpO2: (!) 74 % (03/20/20 0228) Vital Signs (24h Range):  Temp:  [97.5 °F (36.4 °C)] 97.5 °F (36.4 °C)  Pulse:  [] 110  Resp:  [18-24] 21  SpO2:  [74 %-100 %] 74 %  BP: (114-179)/() 114/63     Weight: 82.8 kg (182 lb 8.7 oz)  Body mass index is 30.38 kg/m².    Physical Exam   Constitutional: She appears well-developed and well-nourished. No distress.   HENT:   Head: Normocephalic and atraumatic.   Eyes: Pupils are equal, round, and reactive to light. Conjunctivae and EOM are normal. No scleral icterus.   Neck: Normal range of motion. Neck supple. No tracheal deviation present.   Cardiovascular: Normal rate, regular rhythm, normal heart sounds and intact distal pulses. Exam reveals no gallop and no friction rub.   No murmur heard.  Pulmonary/Chest: Effort normal and breath sounds normal. No stridor. No respiratory distress. She has no wheezes.   Examined while intubated. Mechanical breath sounds.   Abdominal: Soft. Bowel sounds are normal. She exhibits no distension. There is no tenderness. There is no guarding.   Neurological:    Bilateral pupils very sluggish, response is minimal. Does not respond to pain.    Skin: Skin is warm and dry. Capillary refill takes more than 3 seconds. She is not diaphoretic.   Cool extremities.    Nursing note and vitals reviewed.        CRANIAL NERVES     CN III, IV, VI   Pupils are equal, round, and reactive to light.  Extraocular motions are normal.        Significant Labs:   BMP:   Recent Labs   Lab 03/19/20 2025   *   *   K 4.4   CL 97   CO2 16*   BUN 8   CREATININE 0.8   CALCIUM 8.4*   MG 2.0     CBC:   Recent Labs   Lab 03/19/20 2025   WBC 17.43*   HGB 15.7   HCT 47.6   *     CMP:   Recent Labs   Lab 03/19/20 2025   *   K 4.4   CL 97   CO2 16*   *   BUN 8   CREATININE 0.8   CALCIUM 8.4*   PROT 7.9   ALBUMIN 2.4*   BILITOT 0.7   ALKPHOS 173*   AST 63*   ALT 27   ANIONGAP 16   EGFRNONAA >60     Urine Culture: No results for input(s): LABURIN in the last 48 hours.  Urine Studies:   Recent Labs   Lab 03/19/20  2213   COLORU Yellow   APPEARANCEUA Clear   PHUR 6.0   SPECGRAV >=1.030*   PROTEINUA 3+*   GLUCUA Negative   KETONESU Trace*   BILIRUBINUA Negative   OCCULTUA 3+*   NITRITE Positive*   UROBILINOGEN 4.0-6.0*   LEUKOCYTESUR Trace*   RBCUA 50*   WBCUA 50*   BACTERIA Moderate*   HYALINECASTS 0     All pertinent labs within the past 24 hours have been reviewed.    Significant Imaging: I have reviewed all pertinent imaging results/findings within the past 24 hours.   Imaging Results          X-Ray Chest AP Portable (Final result)  Result time 03/19/20 21:56:41    Final result by Jo Ann Castaneda MD (03/19/20 21:56:41)                 Impression:      Findings may represent multifocal pneumonia.  Pulmonary edema may be another consideration.      Electronically signed by: Jo Ann Castaneda  Date:    03/19/2020  Time:    21:56             Narrative:    EXAMINATION:  AP PORTABLE CHEST    CLINICAL HISTORY:  Cough    TECHNIQUE:  AP portable chest radiograph was  submitted.    COMPARISON:  03/19/2020 at 20:40    FINDINGS:  The tip of the feeding tube is seen approximately 3 cm above the christine.  The tip of the feeding tube is seen projecting over the stomach.  AP portable chest radiograph demonstrates a cardiac silhouette within normal limits.  There are bilateral airspace and interstitial opacities.  There is asymmetric elevation of the right hemidiaphragm.                               X-Ray Chest AP Portable (Final result)  Result time 03/19/20 20:48:25    Final result by Obed Zheng MD (03/19/20 20:48:25)                 Impression:      1. Central hilar interstitial and parenchymal findings may reflect edema although given clinical history of sepsis, nonspecific infectious or inflammatory pneumonitis are considerations, correlation is advised.      Electronically signed by: Obed Zheng MD  Date:    03/19/2020  Time:    20:48             Narrative:    EXAMINATION:  XR CHEST AP PORTABLE    CLINICAL HISTORY:  Sepsis;    TECHNIQUE:  Single frontal view of the chest was performed.    COMPARISON:  02/23/2016    FINDINGS:  The cardiomediastinal silhouette is not enlarged.  There is elevation of the right hemidiaphragm..  There is no pleural effusion.  The trachea is midline.  The lungs are symmetrically expanded bilaterally with prominent interstitial and parenchymal attenuation primarily in a perihilar distribution..  No large focal consolidation seen.  There is no pneumothorax.  The osseous structures are remarkable for degenerative changes..

## 2020-03-20 NOTE — ASSESSMENT & PLAN NOTE
- s/p rocephin in ED   - continue IV antibiotics w/ change to vancomycin and cefepime   -  pending

## 2020-03-20 NOTE — HPI
55 year old female with hx MS (not completely independent in ADLs, and recurrent UTIs presenting afte being found confused and disoriented by her daughter at home today.  At baseline, pt needs assistance with transfers, but is able to dress herself, but today was unable to do so and appeared as though her legs were contracted. Additionally, she had not been eating and drinking as much, and was reporting cough and shortness of breath. On arrival, pt O2 70's but initially hemodynamically stable. She was subsequently intubated, requiring max vent support. She was started on vasopressors, but continued to have increased requirements. Shortly after, midnight, pt went asystole and received one round of chest compressions and Epi x1 with ROSC.  She continued to decline and eventually neither blood pressure via cuff or O2 sat were able to be obtained. Multiple attempts were made to place arterial lines were unsuccessful. Family was notified of grave situation.

## 2020-03-21 NOTE — DISCHARGE SUMMARY
[facility] Hospital Medicine  Discharge Summary      Patient Name: Haylee Mistry  MRN: [id number]  Admission Date: 3/19/2020  Hospital Length of Stay: 1 days  Discharge Date and Time: 3/20/2020  5:21 PM  Attending Physician: No att. providers found   Discharging Provider: Jing Ruiz MD  Primary Care Provider: No primary care provider on file.      HPI:   Ms. Haylee Mistry is a 55 y.o. female, with PMH of Has MS, incontinence (self-caths q4-6 hours), frequent UTI's, she presented due to being disorietned at home today. Her daughter states that usually she feeds herself, and assists with transfers, but she was unable to do so today, and her legs were contracted. Additionally, she wasn't drinking water as usual. Associated symptoms include cough x 1 day, and shortness of breath, with dark colored urine.  She denies any sick contacts or recent travel. She has no sick contacts and has not run a fever at home. In the ED, she was found to have low O2 sats upon arrival at 79%, and she was intubated after a trial on NRBM. She was found to have UTI, and CXR concerning for b/l PNA. She was tested for COVID-19.     * No surgery found *      Hospital Course:   She was started on empiric antibiotics.had multiple electrolyte abnormalities..Patient had cardiac arrest with asystole on monitor and ROSC achieved after epinephrine.Patient declined and had to be started on pressors and was on maximum vent support, but still sats could not be obtained.Arterial line attempts were unsuccessful.She was non responsive without sedation, pupils were non reactive.She was made 2 physician DNR as she was actively dying.She had asystole on monitor and was pronounced on 3/20/20 at 1105.     Consults:   Consults (From admission, onward)        Status Ordering Provider     Inpatient consult to Pulmonary Critical Care  Once     Provider:  Ni Beltrán MD    Completed VIDAL CONDE     Inpatient consult to  Registered Dietitian/Nutritionist  Once     Provider:  (Not yet assigned)    Completed DARIAN CANO          Hyponatremia  - Na of 121 upon ED labs   - start IV fluids   - assess Na [id number]     Community acquired pneumonia, bilateral  - start IV abx: vancomycin & cefepime   - PORT score: 137      Final Active Diagnoses:    Diagnosis Date Noted POA    PRINCIPAL PROBLEM:  Sepsis [A41.9] 2015 Yes    Acute respiratory failure [J96.00] 2020 Yes    s/p Cardiac arrest [I46.9] 2020 Yes    Hyponatremia [E87.1] 2020 Yes    Community acquired pneumonia, bilateral [J18.9] 2020 Yes    Metabolic encephalopathy [G93.41] 2016 Yes    Acute cystitis [N30.00] 2015 Yes    Essential hypertension [I10]  Yes      Problems Resolved During this Admission:       Discharged Condition:     Disposition:       Significant Diagnostic Studies:   X-Ray Chest AP Portable  Narrative: EXAMINATION:  XR CHEST AP PORTABLE    CLINICAL HISTORY:  hypoxia;    TECHNIQUE:  Single frontal view of the chest was performed.    COMPARISON:  Chest radiograph 2020    FINDINGS:  Endotracheal tube tip terminates approximately 4.0 cm above the christine.  Esophagogastric tube appears to course below the diaphragm and outside the field of view of the examination. Cardiac pacing pad overlies and partially obscures the left hemithorax.  Cardiomediastinal silhouette is stable in size.  There are bilateral interstitial and airspace opacities again demonstrated.  No new large confluent airspace consolidation or pleural effusion appreciated.  No evidence of pneumothorax.  Impression: As above.    Electronically signed by: Torsten Del Cid MD  Date:    2020  Time:    03:19    Lab Results   Component Value Date    WBC 26.06 (H) 2020    HGB 17.4 (H) 2020    HCT 57.9 (H) 2020    MCV 92 2020     2020       CMP  Sodium   Date Value Ref Range Status   2020 133 (L) 136  - 145 mmol/L Final     Potassium   Date Value Ref Range Status   03/20/2020 5.6 (H) 3.5 - 5.1 mmol/L Final     Comment:     Specimen slightly hemolyzed     Chloride   Date Value Ref Range Status   03/20/2020 93 (L) 95 - 110 mmol/L Final     CO2   Date Value Ref Range Status   03/20/2020 13 (L) 23 - 29 mmol/L Final     Glucose   Date Value Ref Range Status   03/20/2020 56 (L) 70 - 110 mg/dL Final     BUN, Bld   Date Value Ref Range Status   03/20/2020 14 6 - 20 mg/dL Final     Creatinine   Date Value Ref Range Status   03/20/2020 2.2 (H) 0.5 - 1.4 mg/dL Final     Calcium   Date Value Ref Range Status   03/20/2020 7.0 (L) 8.7 - 10.5 mg/dL Final     Total Protein   Date Value Ref Range Status   03/20/2020 8.6 (H) 6.0 - 8.4 g/dL Final     Albumin   Date Value Ref Range Status   03/20/2020 2.7 (L) 3.5 - 5.2 g/dL Final     Total Bilirubin   Date Value Ref Range Status   03/20/2020 0.8 0.1 - 1.0 mg/dL Final     Comment:     For infants and newborns, interpretation of results should be based  on gestational age, weight and in agreement with clinical  observations.  Premature Infant recommended reference ranges:  Up to 24 hours.............<8.0 mg/dL  Up to 48 hours............<12.0 mg/dL  3-5 days..................<15.0 mg/dL  6-29 days.................<15.0 mg/dL       Alkaline Phosphatase   Date Value Ref Range Status   03/20/2020 306 (H) 55 - 135 U/L Final     AST   Date Value Ref Range Status   03/20/2020 173 (H) 10 - 40 U/L Final     ALT   Date Value Ref Range Status   03/20/2020 46 (H) 10 - 44 U/L Final     Anion Gap   Date Value Ref Range Status   03/20/2020 27 (H) 8 - 16 mmol/L Final     eGFR if    Date Value Ref Range Status   03/20/2020 28 (A) >60 mL/min/1.73 m^2 Final     eGFR if non    Date Value Ref Range Status   03/20/2020 24 (A) >60 mL/min/1.73 m^2 Final     Comment:     Calculation used to obtain the estimated glomerular filtration  rate (eGFR) is the CKD-EPI equation.             Pending Diagnostic Studies:     Procedure Component Value Units Date/Time    SARS- CoV-2 (COVID-19) QUALITATIVE PCR [[id number]] Collected:  20    Order Status:  Sent Lab Status:  In process Updated:  20 2330    Specimen:  Nasopharyngeal          Medications:      Indwelling Lines/Drains at time of discharge:   Lines/Drains/Airways     Central Venous Catheter Line                 Hemodialysis Catheter 20 0630 1 day    Permacath 20 0630 1 day          Drain                 NG/OG Tube 20 2229 orogastric 18 Fr. 1 day         Urethral Catheter 20 2228 Non-latex 16 Fr. 1 day          Airway                 Airway - Non-Surgical 20 2111 Endotracheal Tube 1 day          Intraosseous Line                 Intraosseous Line 20 0230 Tibia 1 day                         Jing Ruiz MD  Department of Hospital Medicine  [facility]

## 2020-03-21 NOTE — HOSPITAL COURSE
She was started on empiric antibiotics.had multiple electrolyte abnormalities..Patient had cardiac arrest with asystole on monitor and ROSC achieved after epinephrine.Patient declined and had to be started on pressors and was on maximum vent support, but still sats could not be obtained.Arterial line attempts were unsuccessful.She was non responsive without sedation, pupils were non reactive.She was made 2 physician DNR as she was actively dying.She had asystole on monitor and was pronounced on 3/20/20 at 1105.

## 2020-03-22 LAB
BACTERIA UR CULT: ABNORMAL
SARS-COV-2 RNA RESP QL NAA+PROBE: DETECTED

## 2020-03-23 LAB
BACTERIA SPEC AEROBE CULT: ABNORMAL
GRAM STN SPEC: ABNORMAL

## 2020-03-24 LAB — BACTERIA BLD CULT: NORMAL

## 2020-03-24 NOTE — PHYSICIAN QUERY
PT Name: Haylee Mistry  MR #: [id number]    Physician Query Form - Perfusion Diagnosis Clarification     CDS/: Sara Yung RN              Contact information: [phone]  This form is a permanent document in the medical record.     Query Date: March 24, 2020    By submitting this query, we are merely seeking further clarification of documentation. Please utilize your independent clinical judgment when addressing the question(s) below.    The medical record contains the following:    Indicators   Supporting Clinical Findings   Location in Medical Record   x Acute Illness (e.g. AMI, Sepsis, etc.) Profound shock state    Sepsis  Cardiac arrest  Renal failure  Cardiac arrest  Community acquired pneumonia, bilateral  Metabolic encephalopathy    Bedside ECHO revealed significantly decreased LVEF with dilated left ventricle and elevated Troponin   3/20 Pulmonary PN      3/20 DC summary            3/20 Pulmonary PN    Acidosis documented     x ABGs / Labs   POC PH 7.194 (L)   POC PCO2 46.7 (H)   POC PO2 30 (LL)   POC SATURATED O2 44 (L)   FiO2 100     Lactic acid= 3.6, 11.9  Procalcitonin= 244      Covid- detected     Respiratory culture:  Staphylococcus aureus   3/20 ABGs on mechanical ventilation            3/19-20 Lab        3/18 Covid-19      3/18 Resp culture   x Vital Signs HR: 120, 118, 126, 122  RR: 26, 32, 31, 26, 27, 29  Temp: 97.5  BP: 119/62  severely hypotensive   3/19-20 Flowsheet        3/20 Pulm PN   x Hypotension or Low Blood Pressure documented severely hypotensive and requiring maximal ventilatory support    She is on norepinephrine at 1 mcg per kg per minute and we are unable to obtain and blood pressure. Multiple efforts to place an arterial line by both critical care and anesthesiology were unsuccessful.    3/20 Pulmonary PN   x Altered Mental Status or Confusion Metabolic encphalopathy 3/20 DC summary   x Diaphoresis, Cold Extremities or Cyanosis   Pt diaphoretic and cold to touch.     3/20 RN PN    Oliguria       x Medication/Treatment:  -Vasopressors  -Inotropic Drugs  -IV Fluids  -Cardiac Assist Devices  -Hemodynamic Monitoring  -Blood/Blood Products Norepinephrine gtt  NS @ 125cc/hr  NS 500ml Bolus  Vancomycin IVPB  Ceftriaxone IVPB    Cardiac monitor  Mechanical ventilation  CXR  ABG  Continuous oxygen  Pulse oximetry  CBC, BMP  Respiratory culture  COVID-19  Urine culture   3/20 MAR      3/19 MAR    3/19-20 NSG orders               x Other: After Code Blue Ng tube w/ dark brown output which turned to bright red. GI bleed pathways orders started.   3/20 RN PN       Provider, please specify diagnosis or diagnoses associated with above clinical findings.      Gordy all that apply    [   ] Cardiogenic Shock   [   ] Hemorrhagic Shock   [   ] Hypovolemic Shock   [x   ] Septic Shock   [   ] Other Shock (please specify):   [   ] Shock Unspecified   [   ] Other Condition (please specify):   [  ] Clinically Undetermined         Please document in your progress notes daily for the duration of treatment until resolved and include in your discharge summary.

## 2020-03-24 NOTE — PHYSICIAN QUERY
"PT Name: Haylee Mistry  MR #: [id number]    Physician Query Form - Pneumonia Clarification     CDS/: Sara Yung RN              Contact information: [phone]  This form is a permanent document in the medical record.    Query Date:  March 24, 2020    By submitting this query, we are merely seeking further clarification of documentation. Please utilize your independent clinical judgment when addressing the question(s) below.    The Medical record contains the following:   Indicators   Supporting Clinical Findings Location in Medical Record   x "Pneumonia" documented Comminity acquired pneumonia, bilateral   3/20 DC summary   x Chest X-Ray: Central hilar interstitial and parenchymal findings may reflect edema although given clinical history of sepsis, nonspecific infectious or inflammatory pneumonitis are considerations, correlation is advised. 3/19 CXR   x PaO2    PaCO2     O2 sat O2 sats 79% on room air 3/19 Flowsheet     x Cultures  Covid- detected    Respiratory culture:  Staphylococcus aureus   3/19 Lab      3/19 Lab   x Treatment  Ceftriaxone IVPB     Indication of use:  Lower respiratory infection  Vancomycin IVPB    Indication of use:  Lower respiratory infection, UTI      Respiratory culture  COVID-19  CXR  Cardiac monitor  Pulse oximetry  Continuous oxygen  Mechanical ventilation  Pulmonary consult       3/19 NSG orders   x Supplemental O2 O2 100% NRB mask  Mechanical ventilation:  AC/ 320/ 26/ 10+   3/19-20 Flowsheet    Other         Provider, please specify type of pneumonia.    [   ]  Covid-19 and Staphyloccus Pneumonia     [   ] Staphylococcus pneumonia     [   ] Bacterial pneumonia    Specify organism:      [x   ]  Covid-19  viral pneumonia     [   ] Viral Pneumonia (Specify virus):     [   ] Unspecified pneumonia     [   ] Other type of pneumonia (please specify):     [  ] Clinically undetermined         Please document in your progress notes daily for the duration of treatment, until " resolved, and include in your discharge summary.    .

## 2020-03-25 LAB — BACTERIA BLD CULT: NORMAL

## 2020-03-25 NOTE — PHYSICIAN QUERY
PT Name: Haylee Mistry  MR #: [id number]    Physician Query Form - Cause and Effect Relationship Clarification      CDS/: Sara Yung RN             Contact information: [phone]    This form is a permanent document in the medical record.     Query Date: March 25, 2020    By submitting this query, we are merely seeking further clarification of documentation. Please utilize your independent clinical judgment when addressing the question(s) below.    The Medical record contains the following:  Supporting Clinical Findings   Location in record     Community acquired pneumonia, bilateral                                                                                                                                                                                      3/20 DC summary       Covid- detected                                                                                                                                                                                       3/18 Lab         Provider, please clarify if there is any correlation between Sepsis and COVID-19.           Are the conditions:      [ x ] Due to or associated with each other   [  ] Unrelated to each other   [  ] Other (Please Specify): _________________________   [  ] Clinically Undetermined